# Patient Record
Sex: FEMALE | Race: BLACK OR AFRICAN AMERICAN | Employment: UNEMPLOYED | URBAN - METROPOLITAN AREA
[De-identification: names, ages, dates, MRNs, and addresses within clinical notes are randomized per-mention and may not be internally consistent; named-entity substitution may affect disease eponyms.]

---

## 2017-01-01 ENCOUNTER — HOSPITAL ENCOUNTER (INPATIENT)
Age: 57
LOS: 12 days | Discharge: NURSING, REHAB OR LONG-TERM CARE | DRG: 698 | End: 2017-01-20
Attending: EMERGENCY MEDICINE | Admitting: INTERNAL MEDICINE
Payer: MEDICARE

## 2017-01-01 ENCOUNTER — APPOINTMENT (OUTPATIENT)
Dept: CT IMAGING | Age: 57
DRG: 698 | End: 2017-01-01
Attending: EMERGENCY MEDICINE
Payer: MEDICARE

## 2017-01-01 ENCOUNTER — APPOINTMENT (OUTPATIENT)
Dept: GENERAL RADIOLOGY | Age: 57
DRG: 698 | End: 2017-01-01
Attending: EMERGENCY MEDICINE
Payer: MEDICARE

## 2017-01-01 ENCOUNTER — PATIENT OUTREACH (OUTPATIENT)
Dept: INTERNAL MEDICINE CLINIC | Age: 57
End: 2017-01-01

## 2017-01-01 VITALS
WEIGHT: 120 LBS | TEMPERATURE: 98.3 F | HEART RATE: 103 BPM | HEIGHT: 65 IN | DIASTOLIC BLOOD PRESSURE: 80 MMHG | BODY MASS INDEX: 19.99 KG/M2 | SYSTOLIC BLOOD PRESSURE: 138 MMHG | RESPIRATION RATE: 20 BRPM | OXYGEN SATURATION: 100 %

## 2017-01-01 DIAGNOSIS — R40.4 TRANSIENT ALTERATION OF AWARENESS: ICD-10-CM

## 2017-01-01 DIAGNOSIS — R56.9 SEIZURE (HCC): ICD-10-CM

## 2017-01-01 DIAGNOSIS — N39.0 URINARY TRACT INFECTION ASSOCIATED WITH CATHETERIZATION OF URINARY TRACT, UNSPECIFIED INDWELLING URINARY CATHETER TYPE, INITIAL ENCOUNTER (HCC): Primary | ICD-10-CM

## 2017-01-01 DIAGNOSIS — T83.511A URINARY TRACT INFECTION ASSOCIATED WITH CATHETERIZATION OF URINARY TRACT, UNSPECIFIED INDWELLING URINARY CATHETER TYPE, INITIAL ENCOUNTER (HCC): Primary | ICD-10-CM

## 2017-01-01 LAB
ABO + RH BLD: NORMAL
ALBUMIN SERPL BCP-MCNC: 1 G/DL (ref 3.5–5)
ALBUMIN SERPL BCP-MCNC: 1.1 G/DL (ref 3.5–5)
ALBUMIN/GLOB SERPL: 0.2 {RATIO} (ref 1.1–2.2)
ALBUMIN/GLOB SERPL: 0.2 {RATIO} (ref 1.1–2.2)
ALP SERPL-CCNC: 387 U/L (ref 45–117)
ALP SERPL-CCNC: 399 U/L (ref 45–117)
ALT SERPL-CCNC: 12 U/L (ref 12–78)
ALT SERPL-CCNC: 13 U/L (ref 12–78)
AMMONIA PLAS-SCNC: 35 UMOL/L
AMPHET UR QL SCN: NEGATIVE
ANION GAP BLD CALC-SCNC: 10 MMOL/L (ref 5–15)
ANION GAP BLD CALC-SCNC: 11 MMOL/L (ref 5–15)
ANION GAP BLD CALC-SCNC: 11 MMOL/L (ref 5–15)
ANION GAP BLD CALC-SCNC: 9 MMOL/L (ref 5–15)
APPEARANCE UR: ABNORMAL
AST SERPL W P-5'-P-CCNC: 18 U/L (ref 15–37)
AST SERPL W P-5'-P-CCNC: 27 U/L (ref 15–37)
ATRIAL RATE: 102 BPM
BACTERIA SPEC CULT: NORMAL
BACTERIA URNS QL MICRO: ABNORMAL /HPF
BARBITURATES UR QL SCN: NEGATIVE
BASOPHILS # BLD AUTO: 0 K/UL (ref 0–0.1)
BASOPHILS # BLD: 0 % (ref 0–1)
BENZODIAZ UR QL: NEGATIVE
BILIRUB SERPL-MCNC: 0.1 MG/DL (ref 0.2–1)
BILIRUB SERPL-MCNC: 0.2 MG/DL (ref 0.2–1)
BILIRUB UR QL: NEGATIVE
BLD PROD TYP BPU: NORMAL
BLOOD GROUP ANTIBODIES SERPL: NORMAL
BPU ID: NORMAL
BUN SERPL-MCNC: 10 MG/DL (ref 6–20)
BUN SERPL-MCNC: 11 MG/DL (ref 6–20)
BUN SERPL-MCNC: 15 MG/DL (ref 6–20)
BUN SERPL-MCNC: 17 MG/DL (ref 6–20)
BUN SERPL-MCNC: 18 MG/DL (ref 6–20)
BUN/CREAT SERPL: 16 (ref 12–20)
BUN/CREAT SERPL: 19 (ref 12–20)
BUN/CREAT SERPL: 20 (ref 12–20)
BUN/CREAT SERPL: 21 (ref 12–20)
BUN/CREAT SERPL: 22 (ref 12–20)
BUN/CREAT SERPL: 23 (ref 12–20)
BUN/CREAT SERPL: 23 (ref 12–20)
C DIFF TOX GENS STL QL NAA+PROBE: NEGATIVE
C JEJUNI+C COLI AG STL QL: NEGATIVE
CALCIUM SERPL-MCNC: 7.1 MG/DL (ref 8.5–10.1)
CALCIUM SERPL-MCNC: 7.3 MG/DL (ref 8.5–10.1)
CALCIUM SERPL-MCNC: 7.6 MG/DL (ref 8.5–10.1)
CALCIUM SERPL-MCNC: 7.7 MG/DL (ref 8.5–10.1)
CALCIUM SERPL-MCNC: 7.7 MG/DL (ref 8.5–10.1)
CALCIUM SERPL-MCNC: 7.8 MG/DL (ref 8.5–10.1)
CALCIUM SERPL-MCNC: 7.9 MG/DL (ref 8.5–10.1)
CALCULATED P AXIS, ECG09: 52 DEGREES
CALCULATED R AXIS, ECG10: 15 DEGREES
CALCULATED T AXIS, ECG11: 69 DEGREES
CANNABINOIDS UR QL SCN: NEGATIVE
CC UR VC: NORMAL
CHLORIDE SERPL-SCNC: 105 MMOL/L (ref 97–108)
CHLORIDE SERPL-SCNC: 106 MMOL/L (ref 97–108)
CHLORIDE SERPL-SCNC: 109 MMOL/L (ref 97–108)
CHLORIDE SERPL-SCNC: 112 MMOL/L (ref 97–108)
CHLORIDE SERPL-SCNC: 112 MMOL/L (ref 97–108)
CHLORIDE SERPL-SCNC: 113 MMOL/L (ref 97–108)
CHLORIDE SERPL-SCNC: 117 MMOL/L (ref 97–108)
CHOLEST SERPL-MCNC: 136 MG/DL
CK SERPL-CCNC: 178 U/L (ref 26–192)
CO2 SERPL-SCNC: 18 MMOL/L (ref 21–32)
CO2 SERPL-SCNC: 20 MMOL/L (ref 21–32)
CO2 SERPL-SCNC: 21 MMOL/L (ref 21–32)
CO2 SERPL-SCNC: 21 MMOL/L (ref 21–32)
CO2 SERPL-SCNC: 23 MMOL/L (ref 21–32)
CO2 SERPL-SCNC: 25 MMOL/L (ref 21–32)
CO2 SERPL-SCNC: 26 MMOL/L (ref 21–32)
COCAINE UR QL SCN: NEGATIVE
COLOR UR: ABNORMAL
CREAT SERPL-MCNC: 0.46 MG/DL (ref 0.55–1.02)
CREAT SERPL-MCNC: 0.47 MG/DL (ref 0.55–1.02)
CREAT SERPL-MCNC: 0.53 MG/DL (ref 0.55–1.02)
CREAT SERPL-MCNC: 0.63 MG/DL (ref 0.55–1.02)
CREAT SERPL-MCNC: 0.72 MG/DL (ref 0.55–1.02)
CREAT SERPL-MCNC: 0.77 MG/DL (ref 0.55–1.02)
CREAT SERPL-MCNC: 0.83 MG/DL (ref 0.55–1.02)
CROSSMATCH RESULT,%XM: NORMAL
DIAGNOSIS, 93000: NORMAL
DIFFERENTIAL METHOD BLD: ABNORMAL
DIFFERENTIAL METHOD BLD: ABNORMAL
DRUG SCRN COMMENT,DRGCM: NORMAL
E COLI SXT1+2 STL IA: NO GROWTH
EOSINOPHIL # BLD: 0 K/UL (ref 0–0.4)
EOSINOPHIL # BLD: 0 K/UL (ref 0–0.4)
EOSINOPHIL # BLD: 0.1 K/UL (ref 0–0.4)
EOSINOPHIL # BLD: 0.1 K/UL (ref 0–0.4)
EOSINOPHIL NFR BLD: 0 % (ref 0–7)
EOSINOPHIL NFR BLD: 1 % (ref 0–7)
EPITH CASTS URNS QL MICRO: ABNORMAL /LPF
ERYTHROCYTE [DISTWIDTH] IN BLOOD BY AUTOMATED COUNT: 14.9 % (ref 11.5–14.5)
ERYTHROCYTE [DISTWIDTH] IN BLOOD BY AUTOMATED COUNT: 15.1 % (ref 11.5–14.5)
ERYTHROCYTE [DISTWIDTH] IN BLOOD BY AUTOMATED COUNT: 15.2 % (ref 11.5–14.5)
EST. AVERAGE GLUCOSE BLD GHB EST-MCNC: 143 MG/DL
FAT STL QL: NORMAL
FERRITIN SERPL-MCNC: 1064 NG/ML (ref 8–252)
GLOBULIN SER CALC-MCNC: 5.2 G/DL (ref 2–4)
GLOBULIN SER CALC-MCNC: 5.9 G/DL (ref 2–4)
GLUCOSE BLD STRIP.AUTO-MCNC: 100 MG/DL (ref 65–100)
GLUCOSE BLD STRIP.AUTO-MCNC: 102 MG/DL (ref 65–100)
GLUCOSE BLD STRIP.AUTO-MCNC: 103 MG/DL (ref 65–100)
GLUCOSE BLD STRIP.AUTO-MCNC: 106 MG/DL (ref 65–100)
GLUCOSE BLD STRIP.AUTO-MCNC: 107 MG/DL (ref 65–100)
GLUCOSE BLD STRIP.AUTO-MCNC: 109 MG/DL (ref 65–100)
GLUCOSE BLD STRIP.AUTO-MCNC: 111 MG/DL (ref 65–100)
GLUCOSE BLD STRIP.AUTO-MCNC: 112 MG/DL (ref 65–100)
GLUCOSE BLD STRIP.AUTO-MCNC: 113 MG/DL (ref 65–100)
GLUCOSE BLD STRIP.AUTO-MCNC: 115 MG/DL (ref 65–100)
GLUCOSE BLD STRIP.AUTO-MCNC: 118 MG/DL (ref 65–100)
GLUCOSE BLD STRIP.AUTO-MCNC: 119 MG/DL (ref 65–100)
GLUCOSE BLD STRIP.AUTO-MCNC: 123 MG/DL (ref 65–100)
GLUCOSE BLD STRIP.AUTO-MCNC: 124 MG/DL (ref 65–100)
GLUCOSE BLD STRIP.AUTO-MCNC: 126 MG/DL (ref 65–100)
GLUCOSE BLD STRIP.AUTO-MCNC: 127 MG/DL (ref 65–100)
GLUCOSE BLD STRIP.AUTO-MCNC: 131 MG/DL (ref 65–100)
GLUCOSE BLD STRIP.AUTO-MCNC: 131 MG/DL (ref 65–100)
GLUCOSE BLD STRIP.AUTO-MCNC: 133 MG/DL (ref 65–100)
GLUCOSE BLD STRIP.AUTO-MCNC: 135 MG/DL (ref 65–100)
GLUCOSE BLD STRIP.AUTO-MCNC: 138 MG/DL (ref 65–100)
GLUCOSE BLD STRIP.AUTO-MCNC: 140 MG/DL (ref 65–100)
GLUCOSE BLD STRIP.AUTO-MCNC: 144 MG/DL (ref 65–100)
GLUCOSE BLD STRIP.AUTO-MCNC: 158 MG/DL (ref 65–100)
GLUCOSE BLD STRIP.AUTO-MCNC: 166 MG/DL (ref 65–100)
GLUCOSE BLD STRIP.AUTO-MCNC: 168 MG/DL (ref 65–100)
GLUCOSE BLD STRIP.AUTO-MCNC: 169 MG/DL (ref 65–100)
GLUCOSE BLD STRIP.AUTO-MCNC: 170 MG/DL (ref 65–100)
GLUCOSE BLD STRIP.AUTO-MCNC: 178 MG/DL (ref 65–100)
GLUCOSE BLD STRIP.AUTO-MCNC: 192 MG/DL (ref 65–100)
GLUCOSE BLD STRIP.AUTO-MCNC: 193 MG/DL (ref 65–100)
GLUCOSE BLD STRIP.AUTO-MCNC: 197 MG/DL (ref 65–100)
GLUCOSE BLD STRIP.AUTO-MCNC: 203 MG/DL (ref 65–100)
GLUCOSE BLD STRIP.AUTO-MCNC: 205 MG/DL (ref 65–100)
GLUCOSE BLD STRIP.AUTO-MCNC: 209 MG/DL (ref 65–100)
GLUCOSE BLD STRIP.AUTO-MCNC: 306 MG/DL (ref 65–100)
GLUCOSE BLD STRIP.AUTO-MCNC: 60 MG/DL (ref 65–100)
GLUCOSE BLD STRIP.AUTO-MCNC: 66 MG/DL (ref 65–100)
GLUCOSE BLD STRIP.AUTO-MCNC: 67 MG/DL (ref 65–100)
GLUCOSE BLD STRIP.AUTO-MCNC: 72 MG/DL (ref 65–100)
GLUCOSE BLD STRIP.AUTO-MCNC: 74 MG/DL (ref 65–100)
GLUCOSE BLD STRIP.AUTO-MCNC: 76 MG/DL (ref 65–100)
GLUCOSE BLD STRIP.AUTO-MCNC: 77 MG/DL (ref 65–100)
GLUCOSE BLD STRIP.AUTO-MCNC: 79 MG/DL (ref 65–100)
GLUCOSE BLD STRIP.AUTO-MCNC: 79 MG/DL (ref 65–100)
GLUCOSE BLD STRIP.AUTO-MCNC: 81 MG/DL (ref 65–100)
GLUCOSE BLD STRIP.AUTO-MCNC: 82 MG/DL (ref 65–100)
GLUCOSE BLD STRIP.AUTO-MCNC: 83 MG/DL (ref 65–100)
GLUCOSE BLD STRIP.AUTO-MCNC: 85 MG/DL (ref 65–100)
GLUCOSE BLD STRIP.AUTO-MCNC: 87 MG/DL (ref 65–100)
GLUCOSE BLD STRIP.AUTO-MCNC: 87 MG/DL (ref 65–100)
GLUCOSE BLD STRIP.AUTO-MCNC: 89 MG/DL (ref 65–100)
GLUCOSE BLD STRIP.AUTO-MCNC: 92 MG/DL (ref 65–100)
GLUCOSE BLD STRIP.AUTO-MCNC: 94 MG/DL (ref 65–100)
GLUCOSE BLD STRIP.AUTO-MCNC: 94 MG/DL (ref 65–100)
GLUCOSE SERPL-MCNC: 101 MG/DL (ref 65–100)
GLUCOSE SERPL-MCNC: 114 MG/DL (ref 65–100)
GLUCOSE SERPL-MCNC: 130 MG/DL (ref 65–100)
GLUCOSE SERPL-MCNC: 330 MG/DL (ref 65–100)
GLUCOSE SERPL-MCNC: 362 MG/DL (ref 65–100)
GLUCOSE SERPL-MCNC: 69 MG/DL (ref 65–100)
GLUCOSE SERPL-MCNC: 71 MG/DL (ref 65–100)
GLUCOSE UR STRIP.AUTO-MCNC: 250 MG/DL
HBA1C MFR BLD: 6.6 % (ref 4.2–6.3)
HCT VFR BLD AUTO: 22.6 % (ref 35–47)
HCT VFR BLD AUTO: 24.3 % (ref 35–47)
HCT VFR BLD AUTO: 26.4 % (ref 35–47)
HCT VFR BLD AUTO: 26.8 % (ref 35–47)
HCT VFR BLD AUTO: 28.2 % (ref 35–47)
HDLC SERPL-MCNC: 35 MG/DL
HDLC SERPL: 3.9 {RATIO} (ref 0–5)
HGB BLD-MCNC: 6.8 G/DL (ref 11.5–16)
HGB BLD-MCNC: 7.2 G/DL (ref 11.5–16)
HGB BLD-MCNC: 8 G/DL (ref 11.5–16)
HGB BLD-MCNC: 8 G/DL (ref 11.5–16)
HGB BLD-MCNC: 8.9 G/DL (ref 11.5–16)
HGB UR QL STRIP: ABNORMAL
IRON SATN MFR SERPL: 32 % (ref 20–50)
IRON SERPL-MCNC: 22 UG/DL (ref 35–150)
KETONES UR QL STRIP.AUTO: ABNORMAL MG/DL
LACTATE SERPL-SCNC: 1.6 MMOL/L (ref 0.4–2)
LACTATE SERPL-SCNC: 1.7 MMOL/L (ref 0.4–2)
LDLC SERPL CALC-MCNC: 69.8 MG/DL (ref 0–100)
LEUKOCYTE ESTERASE UR QL STRIP.AUTO: ABNORMAL
LEVETIRACETAM SERPL-MCNC: NORMAL UG/ML (ref 10–40)
LIPID PROFILE,FLP: ABNORMAL
LYMPHOCYTES # BLD AUTO: 11 % (ref 12–49)
LYMPHOCYTES # BLD AUTO: 13 % (ref 12–49)
LYMPHOCYTES # BLD AUTO: 14 % (ref 12–49)
LYMPHOCYTES # BLD AUTO: 15 % (ref 12–49)
LYMPHOCYTES # BLD: 1.3 K/UL (ref 0.8–3.5)
LYMPHOCYTES # BLD: 1.5 K/UL (ref 0.8–3.5)
LYMPHOCYTES # BLD: 1.8 K/UL (ref 0.8–3.5)
LYMPHOCYTES # BLD: 1.8 K/UL (ref 0.8–3.5)
MAGNESIUM SERPL-MCNC: 1.6 MG/DL (ref 1.6–2.4)
MAGNESIUM SERPL-MCNC: 1.7 MG/DL (ref 1.6–2.4)
MAGNESIUM SERPL-MCNC: 2.1 MG/DL (ref 1.6–2.4)
MAGNESIUM SERPL-MCNC: 2.1 MG/DL (ref 1.6–2.4)
MAGNESIUM SERPL-MCNC: 2.2 MG/DL (ref 1.6–2.4)
MCH RBC QN AUTO: 23.2 PG (ref 26–34)
MCH RBC QN AUTO: 23.4 PG (ref 26–34)
MCH RBC QN AUTO: 23.6 PG (ref 26–34)
MCH RBC QN AUTO: 24.6 PG (ref 26–34)
MCH RBC QN AUTO: 24.7 PG (ref 26–34)
MCHC RBC AUTO-ENTMCNC: 29.6 G/DL (ref 30–36.5)
MCHC RBC AUTO-ENTMCNC: 29.9 G/DL (ref 30–36.5)
MCHC RBC AUTO-ENTMCNC: 30.1 G/DL (ref 30–36.5)
MCHC RBC AUTO-ENTMCNC: 30.3 G/DL (ref 30–36.5)
MCHC RBC AUTO-ENTMCNC: 31.6 G/DL (ref 30–36.5)
MCV RBC AUTO: 77.9 FL (ref 80–99)
MCV RBC AUTO: 78.1 FL (ref 80–99)
MCV RBC AUTO: 78.4 FL (ref 80–99)
MCV RBC AUTO: 78.5 FL (ref 80–99)
MCV RBC AUTO: 81.5 FL (ref 80–99)
METHADONE UR QL: NEGATIVE
MONOCYTES # BLD: 0.4 K/UL (ref 0–1)
MONOCYTES # BLD: 0.5 K/UL (ref 0–1)
MONOCYTES # BLD: 0.5 K/UL (ref 0–1)
MONOCYTES # BLD: 0.6 K/UL (ref 0–1)
MONOCYTES NFR BLD AUTO: 3 % (ref 5–13)
MONOCYTES NFR BLD AUTO: 4 % (ref 5–13)
MONOCYTES NFR BLD AUTO: 4 % (ref 5–13)
MONOCYTES NFR BLD AUTO: 5 % (ref 5–13)
NEUTRAL FAT STL QL: NORMAL
NEUTS SEG # BLD: 10.2 K/UL (ref 1.8–8)
NEUTS SEG # BLD: 10.3 K/UL (ref 1.8–8)
NEUTS SEG # BLD: 9.7 K/UL (ref 1.8–8)
NEUTS SEG # BLD: 9.8 K/UL (ref 1.8–8)
NEUTS SEG NFR BLD AUTO: 81 % (ref 32–75)
NEUTS SEG NFR BLD AUTO: 81 % (ref 32–75)
NEUTS SEG NFR BLD AUTO: 83 % (ref 32–75)
NEUTS SEG NFR BLD AUTO: 85 % (ref 32–75)
NITRITE UR QL STRIP.AUTO: NEGATIVE
NRBC # BLD: 0.02 K/UL (ref 0–0.01)
NRBC BLD-RTO: 0.1 PER 100 WBC
OPIATES UR QL: NEGATIVE
P-R INTERVAL, ECG05: 136 MS
PCP UR QL: NEGATIVE
PH UR STRIP: 6 [PH] (ref 5–8)
PLATELET # BLD AUTO: 450 K/UL (ref 150–400)
PLATELET # BLD AUTO: 494 K/UL (ref 150–400)
PLATELET # BLD AUTO: 516 K/UL (ref 150–400)
PLATELET # BLD AUTO: 523 K/UL (ref 150–400)
PLATELET # BLD AUTO: 558 K/UL (ref 150–400)
POTASSIUM SERPL-SCNC: 2.9 MMOL/L (ref 3.5–5.1)
POTASSIUM SERPL-SCNC: 3.2 MMOL/L (ref 3.5–5.1)
POTASSIUM SERPL-SCNC: 3.4 MMOL/L (ref 3.5–5.1)
POTASSIUM SERPL-SCNC: 3.6 MMOL/L (ref 3.5–5.1)
POTASSIUM SERPL-SCNC: 4.6 MMOL/L (ref 3.5–5.1)
PROT SERPL-MCNC: 6.2 G/DL (ref 6.4–8.2)
PROT SERPL-MCNC: 7 G/DL (ref 6.4–8.2)
PROT UR STRIP-MCNC: >300 MG/DL
Q-T INTERVAL, ECG07: 354 MS
QRS DURATION, ECG06: 64 MS
QTC CALCULATION (BEZET), ECG08: 461 MS
RBC # BLD AUTO: 2.88 M/UL (ref 3.8–5.2)
RBC # BLD AUTO: 3.11 M/UL (ref 3.8–5.2)
RBC # BLD AUTO: 3.24 M/UL (ref 3.8–5.2)
RBC # BLD AUTO: 3.42 M/UL (ref 3.8–5.2)
RBC # BLD AUTO: 3.62 M/UL (ref 3.8–5.2)
RBC #/AREA URNS HPF: ABNORMAL /HPF (ref 0–5)
RBC MORPH BLD: ABNORMAL
RETICS/RBC NFR AUTO: 1.5 % (ref 0.7–2.1)
SERVICE CMNT-IMP: ABNORMAL
SERVICE CMNT-IMP: NORMAL
SODIUM SERPL-SCNC: 140 MMOL/L (ref 136–145)
SODIUM SERPL-SCNC: 141 MMOL/L (ref 136–145)
SODIUM SERPL-SCNC: 142 MMOL/L (ref 136–145)
SODIUM SERPL-SCNC: 142 MMOL/L (ref 136–145)
SODIUM SERPL-SCNC: 143 MMOL/L (ref 136–145)
SODIUM SERPL-SCNC: 145 MMOL/L (ref 136–145)
SODIUM SERPL-SCNC: 146 MMOL/L (ref 136–145)
SP GR UR REFRACTOMETRY: 1.02 (ref 1–1.03)
SPECIMEN EXP DATE BLD: NORMAL
STATUS OF UNIT,%ST: NORMAL
TIBC SERPL-MCNC: 68 UG/DL (ref 250–450)
TRIGL SERPL-MCNC: 156 MG/DL (ref ?–150)
TROPONIN I SERPL-MCNC: <0.04 NG/ML
TSH SERPL DL<=0.05 MIU/L-ACNC: 1.25 UIU/ML (ref 0.36–3.74)
UA: UC IF INDICATED,UAUC: ABNORMAL
UNIT DIVISION, %UDIV: 0
UROBILINOGEN UR QL STRIP.AUTO: 0.2 EU/DL (ref 0.2–1)
VENTRICULAR RATE, ECG03: 102 BPM
VLDLC SERPL CALC-MCNC: 31.2 MG/DL
WBC # BLD AUTO: 11.9 K/UL (ref 3.6–11)
WBC # BLD AUTO: 11.9 K/UL (ref 3.6–11)
WBC # BLD AUTO: 12 K/UL (ref 3.6–11)
WBC # BLD AUTO: 12 K/UL (ref 3.6–11)
WBC # BLD AUTO: 12.5 K/UL (ref 3.6–11)
WBC #/AREA STL HPF: NORMAL /HPF (ref 0–4)
WBC NRBC COR # BLD: ABNORMAL 10*3/UL
WBC URNS QL MICRO: >100 /HPF (ref 0–4)

## 2017-01-01 PROCEDURE — 36415 COLL VENOUS BLD VENIPUNCTURE: CPT | Performed by: INTERNAL MEDICINE

## 2017-01-01 PROCEDURE — 80053 COMPREHEN METABOLIC PANEL: CPT | Performed by: EMERGENCY MEDICINE

## 2017-01-01 PROCEDURE — 74011250636 HC RX REV CODE- 250/636: Performed by: HOSPITALIST

## 2017-01-01 PROCEDURE — 83735 ASSAY OF MAGNESIUM: CPT | Performed by: HOSPITALIST

## 2017-01-01 PROCEDURE — 82962 GLUCOSE BLOOD TEST: CPT

## 2017-01-01 PROCEDURE — 74011250637 HC RX REV CODE- 250/637: Performed by: INTERNAL MEDICINE

## 2017-01-01 PROCEDURE — 65270000015 HC RM PRIVATE ONCOLOGY

## 2017-01-01 PROCEDURE — G8978 MOBILITY CURRENT STATUS: HCPCS

## 2017-01-01 PROCEDURE — 36415 COLL VENOUS BLD VENIPUNCTURE: CPT | Performed by: HOSPITALIST

## 2017-01-01 PROCEDURE — 87045 FECES CULTURE AEROBIC BACT: CPT | Performed by: INTERNAL MEDICINE

## 2017-01-01 PROCEDURE — 83735 ASSAY OF MAGNESIUM: CPT | Performed by: INTERNAL MEDICINE

## 2017-01-01 PROCEDURE — 77030011256 HC DRSG MEPILEX <16IN NO BORD MOLN -A

## 2017-01-01 PROCEDURE — 77030018846 HC SOL IRR STRL H20 ICUM -A

## 2017-01-01 PROCEDURE — 86900 BLOOD TYPING SEROLOGIC ABO: CPT | Performed by: INTERNAL MEDICINE

## 2017-01-01 PROCEDURE — 74011250637 HC RX REV CODE- 250/637: Performed by: HOSPITALIST

## 2017-01-01 PROCEDURE — 80048 BASIC METABOLIC PNL TOTAL CA: CPT | Performed by: INTERNAL MEDICINE

## 2017-01-01 PROCEDURE — 74011000258 HC RX REV CODE- 258: Performed by: INTERNAL MEDICINE

## 2017-01-01 PROCEDURE — 74011250636 HC RX REV CODE- 250/636: Performed by: INTERNAL MEDICINE

## 2017-01-01 PROCEDURE — 74011636637 HC RX REV CODE- 636/637: Performed by: INTERNAL MEDICINE

## 2017-01-01 PROCEDURE — 93005 ELECTROCARDIOGRAM TRACING: CPT

## 2017-01-01 PROCEDURE — 77030009526 HC GEL CARSYN MDII -A

## 2017-01-01 PROCEDURE — 82705 FATS/LIPIDS FECES QUAL: CPT | Performed by: HOSPITALIST

## 2017-01-01 PROCEDURE — 74011000258 HC RX REV CODE- 258: Performed by: EMERGENCY MEDICINE

## 2017-01-01 PROCEDURE — 87493 C DIFF AMPLIFIED PROBE: CPT | Performed by: EMERGENCY MEDICINE

## 2017-01-01 PROCEDURE — 83036 HEMOGLOBIN GLYCOSYLATED A1C: CPT | Performed by: INTERNAL MEDICINE

## 2017-01-01 PROCEDURE — 65270000029 HC RM PRIVATE

## 2017-01-01 PROCEDURE — 87186 SC STD MICRODIL/AGAR DIL: CPT | Performed by: EMERGENCY MEDICINE

## 2017-01-01 PROCEDURE — 74011250637 HC RX REV CODE- 250/637: Performed by: PSYCHIATRY & NEUROLOGY

## 2017-01-01 PROCEDURE — 85025 COMPLETE CBC W/AUTO DIFF WBC: CPT | Performed by: INTERNAL MEDICINE

## 2017-01-01 PROCEDURE — 30233N1 TRANSFUSION OF NONAUTOLOGOUS RED BLOOD CELLS INTO PERIPHERAL VEIN, PERCUTANEOUS APPROACH: ICD-10-PCS | Performed by: INTERNAL MEDICINE

## 2017-01-01 PROCEDURE — 80177 DRUG SCRN QUAN LEVETIRACETAM: CPT | Performed by: INTERNAL MEDICINE

## 2017-01-01 PROCEDURE — 77030018836 HC SOL IRR NACL ICUM -A

## 2017-01-01 PROCEDURE — 80048 BASIC METABOLIC PNL TOTAL CA: CPT | Performed by: HOSPITALIST

## 2017-01-01 PROCEDURE — 36430 TRANSFUSION BLD/BLD COMPNT: CPT

## 2017-01-01 PROCEDURE — 83735 ASSAY OF MAGNESIUM: CPT | Performed by: EMERGENCY MEDICINE

## 2017-01-01 PROCEDURE — 87077 CULTURE AEROBIC IDENTIFY: CPT | Performed by: INTERNAL MEDICINE

## 2017-01-01 PROCEDURE — 97530 THERAPEUTIC ACTIVITIES: CPT | Performed by: PHYSICAL THERAPIST

## 2017-01-01 PROCEDURE — 77030019607 HC DSG BURN S&N -A

## 2017-01-01 PROCEDURE — 65660000000 HC RM CCU STEPDOWN

## 2017-01-01 PROCEDURE — 97165 OT EVAL LOW COMPLEX 30 MIN: CPT

## 2017-01-01 PROCEDURE — 82550 ASSAY OF CK (CPK): CPT | Performed by: EMERGENCY MEDICINE

## 2017-01-01 PROCEDURE — 74011250636 HC RX REV CODE- 250/636: Performed by: EMERGENCY MEDICINE

## 2017-01-01 PROCEDURE — 84484 ASSAY OF TROPONIN QUANT: CPT | Performed by: EMERGENCY MEDICINE

## 2017-01-01 PROCEDURE — 36415 COLL VENOUS BLD VENIPUNCTURE: CPT | Performed by: EMERGENCY MEDICINE

## 2017-01-01 PROCEDURE — 83605 ASSAY OF LACTIC ACID: CPT | Performed by: INTERNAL MEDICINE

## 2017-01-01 PROCEDURE — 85045 AUTOMATED RETICULOCYTE COUNT: CPT | Performed by: INTERNAL MEDICINE

## 2017-01-01 PROCEDURE — 97163 PT EVAL HIGH COMPLEX 45 MIN: CPT

## 2017-01-01 PROCEDURE — 80307 DRUG TEST PRSMV CHEM ANLYZR: CPT | Performed by: INTERNAL MEDICINE

## 2017-01-01 PROCEDURE — G8987 SELF CARE CURRENT STATUS: HCPCS

## 2017-01-01 PROCEDURE — 95816 EEG AWAKE AND DROWSY: CPT | Performed by: PSYCHIATRY & NEUROLOGY

## 2017-01-01 PROCEDURE — 85027 COMPLETE CBC AUTOMATED: CPT | Performed by: HOSPITALIST

## 2017-01-01 PROCEDURE — 85025 COMPLETE CBC W/AUTO DIFF WBC: CPT | Performed by: EMERGENCY MEDICINE

## 2017-01-01 PROCEDURE — 96365 THER/PROPH/DIAG IV INF INIT: CPT

## 2017-01-01 PROCEDURE — 80061 LIPID PANEL: CPT | Performed by: INTERNAL MEDICINE

## 2017-01-01 PROCEDURE — 81001 URINALYSIS AUTO W/SCOPE: CPT | Performed by: EMERGENCY MEDICINE

## 2017-01-01 PROCEDURE — 99284 EMERGENCY DEPT VISIT MOD MDM: CPT

## 2017-01-01 PROCEDURE — 87040 BLOOD CULTURE FOR BACTERIA: CPT | Performed by: INTERNAL MEDICINE

## 2017-01-01 PROCEDURE — 83605 ASSAY OF LACTIC ACID: CPT | Performed by: EMERGENCY MEDICINE

## 2017-01-01 PROCEDURE — 84443 ASSAY THYROID STIM HORMONE: CPT | Performed by: INTERNAL MEDICINE

## 2017-01-01 PROCEDURE — 77030029131 HC ADMN ST IV BLD N DEHP ICUM -B

## 2017-01-01 PROCEDURE — 70450 CT HEAD/BRAIN W/O DYE: CPT

## 2017-01-01 PROCEDURE — 83540 ASSAY OF IRON: CPT | Performed by: INTERNAL MEDICINE

## 2017-01-01 PROCEDURE — 80053 COMPREHEN METABOLIC PANEL: CPT | Performed by: INTERNAL MEDICINE

## 2017-01-01 PROCEDURE — 87077 CULTURE AEROBIC IDENTIFY: CPT | Performed by: EMERGENCY MEDICINE

## 2017-01-01 PROCEDURE — 82140 ASSAY OF AMMONIA: CPT | Performed by: INTERNAL MEDICINE

## 2017-01-01 PROCEDURE — 77030033263 HC DRSG MEPILEX 16-48IN BORD MOLN -B

## 2017-01-01 PROCEDURE — 87181 SC STD AGAR DILUTION PER AGT: CPT | Performed by: EMERGENCY MEDICINE

## 2017-01-01 PROCEDURE — 71010 XR CHEST SNGL V: CPT

## 2017-01-01 PROCEDURE — 82728 ASSAY OF FERRITIN: CPT | Performed by: INTERNAL MEDICINE

## 2017-01-01 PROCEDURE — P9016 RBC LEUKOCYTES REDUCED: HCPCS | Performed by: INTERNAL MEDICINE

## 2017-01-01 PROCEDURE — 86920 COMPATIBILITY TEST SPIN: CPT | Performed by: INTERNAL MEDICINE

## 2017-01-01 PROCEDURE — 87086 URINE CULTURE/COLONY COUNT: CPT | Performed by: EMERGENCY MEDICINE

## 2017-01-01 PROCEDURE — 89055 LEUKOCYTE ASSESSMENT FECAL: CPT | Performed by: INTERNAL MEDICINE

## 2017-01-01 PROCEDURE — G8979 MOBILITY GOAL STATUS: HCPCS

## 2017-01-01 PROCEDURE — 77030010547 HC BG URIN W/UMETER -A

## 2017-01-01 RX ORDER — SERTRALINE HYDROCHLORIDE 50 MG/1
50 TABLET, FILM COATED ORAL DAILY
Status: DISCONTINUED | OUTPATIENT
Start: 2017-01-01 | End: 2017-01-01

## 2017-01-01 RX ORDER — INSULIN LISPRO 100 [IU]/ML
INJECTION, SOLUTION INTRAVENOUS; SUBCUTANEOUS
Status: DISCONTINUED | OUTPATIENT
Start: 2017-01-01 | End: 2017-01-01 | Stop reason: HOSPADM

## 2017-01-01 RX ORDER — SERTRALINE HYDROCHLORIDE 50 MG/1
50 TABLET, FILM COATED ORAL DAILY
Qty: 30 TAB | Refills: 0 | Status: SHIPPED | OUTPATIENT
Start: 2017-01-01

## 2017-01-01 RX ORDER — LORAZEPAM 0.5 MG/1
0.5 TABLET ORAL
Status: DISCONTINUED | OUTPATIENT
Start: 2017-01-01 | End: 2017-01-01 | Stop reason: HOSPADM

## 2017-01-01 RX ORDER — LORAZEPAM 0.5 MG/1
0.5 TABLET ORAL
Qty: 10 TAB | Refills: 0 | Status: SHIPPED | OUTPATIENT
Start: 2017-01-01

## 2017-01-01 RX ORDER — LEVETIRACETAM 1000 MG/1
1000 TABLET ORAL 2 TIMES DAILY
Qty: 60 TAB | Refills: 0 | Status: SHIPPED | OUTPATIENT
Start: 2017-01-01

## 2017-01-01 RX ORDER — SODIUM CHLORIDE 9 MG/ML
100 INJECTION, SOLUTION INTRAVENOUS CONTINUOUS
Status: DISCONTINUED | OUTPATIENT
Start: 2017-01-01 | End: 2017-01-01

## 2017-01-01 RX ORDER — METFORMIN HYDROCHLORIDE 500 MG/1
500 TABLET ORAL 2 TIMES DAILY WITH MEALS
Status: DISCONTINUED | OUTPATIENT
Start: 2017-01-01 | End: 2017-01-01

## 2017-01-01 RX ORDER — POTASSIUM CHLORIDE 20 MEQ/1
20 TABLET, EXTENDED RELEASE ORAL 2 TIMES DAILY
Qty: 60 TAB | Refills: 0 | Status: SHIPPED | OUTPATIENT
Start: 2017-01-01 | End: 2017-01-01

## 2017-01-01 RX ORDER — LOPERAMIDE HYDROCHLORIDE 2 MG/1
4 CAPSULE ORAL
Status: DISCONTINUED | OUTPATIENT
Start: 2017-01-01 | End: 2017-01-01 | Stop reason: HOSPADM

## 2017-01-01 RX ORDER — CIPROFLOXACIN 250 MG/1
750 TABLET, FILM COATED ORAL EVERY 12 HOURS
Status: COMPLETED | OUTPATIENT
Start: 2017-01-01 | End: 2017-01-01

## 2017-01-01 RX ORDER — SERTRALINE HYDROCHLORIDE 50 MG/1
50 TABLET, FILM COATED ORAL DAILY
Qty: 10 TAB | Refills: 0 | Status: SHIPPED
Start: 2017-01-01 | End: 2017-01-01

## 2017-01-01 RX ORDER — MAGNESIUM SULFATE HEPTAHYDRATE 40 MG/ML
2 INJECTION, SOLUTION INTRAVENOUS ONCE
Status: COMPLETED | OUTPATIENT
Start: 2017-01-01 | End: 2017-01-01

## 2017-01-01 RX ORDER — ACETAMINOPHEN 325 MG/1
650 TABLET ORAL
Status: DISCONTINUED | OUTPATIENT
Start: 2017-01-01 | End: 2017-01-01 | Stop reason: HOSPADM

## 2017-01-01 RX ORDER — FAMOTIDINE 20 MG/1
20 TABLET, FILM COATED ORAL DAILY
Status: DISCONTINUED | OUTPATIENT
Start: 2017-01-01 | End: 2017-01-01 | Stop reason: HOSPADM

## 2017-01-01 RX ORDER — HEPARIN SODIUM 5000 [USP'U]/ML
5000 INJECTION, SOLUTION INTRAVENOUS; SUBCUTANEOUS EVERY 8 HOURS
Status: DISCONTINUED | OUTPATIENT
Start: 2017-01-01 | End: 2017-01-01 | Stop reason: HOSPADM

## 2017-01-01 RX ORDER — MAGNESIUM SULFATE 100 %
4 CRYSTALS MISCELLANEOUS AS NEEDED
Status: DISCONTINUED | OUTPATIENT
Start: 2017-01-01 | End: 2017-01-01 | Stop reason: HOSPADM

## 2017-01-01 RX ORDER — SODIUM CHLORIDE 0.9 % (FLUSH) 0.9 %
5-10 SYRINGE (ML) INJECTION EVERY 8 HOURS
Status: DISCONTINUED | OUTPATIENT
Start: 2017-01-01 | End: 2017-01-01 | Stop reason: HOSPADM

## 2017-01-01 RX ORDER — OXYCODONE HYDROCHLORIDE 5 MG/1
5 TABLET ORAL
Status: DISCONTINUED | OUTPATIENT
Start: 2017-01-01 | End: 2017-01-01 | Stop reason: HOSPADM

## 2017-01-01 RX ORDER — LEVETIRACETAM 500 MG/1
500 TABLET ORAL 2 TIMES DAILY
Status: DISCONTINUED | OUTPATIENT
Start: 2017-01-01 | End: 2017-01-01

## 2017-01-01 RX ORDER — LANOLIN ALCOHOL/MO/W.PET/CERES
400 CREAM (GRAM) TOPICAL 2 TIMES DAILY
Status: DISCONTINUED | OUTPATIENT
Start: 2017-01-01 | End: 2017-01-01

## 2017-01-01 RX ORDER — SODIUM CHLORIDE 0.9 % (FLUSH) 0.9 %
5-10 SYRINGE (ML) INJECTION AS NEEDED
Status: DISCONTINUED | OUTPATIENT
Start: 2017-01-01 | End: 2017-01-01 | Stop reason: HOSPADM

## 2017-01-01 RX ORDER — POTASSIUM CHLORIDE 750 MG/1
40 TABLET, FILM COATED, EXTENDED RELEASE ORAL EVERY 6 HOURS
Status: COMPLETED | OUTPATIENT
Start: 2017-01-01 | End: 2017-01-01

## 2017-01-01 RX ORDER — DEXTROSE 50 % IN WATER (D50W) INTRAVENOUS SYRINGE
12.5-25 AS NEEDED
Status: DISCONTINUED | OUTPATIENT
Start: 2017-01-01 | End: 2017-01-01 | Stop reason: HOSPADM

## 2017-01-01 RX ORDER — LANOLIN ALCOHOL/MO/W.PET/CERES
325 CREAM (GRAM) TOPICAL
Status: DISCONTINUED | OUTPATIENT
Start: 2017-01-01 | End: 2017-01-01 | Stop reason: HOSPADM

## 2017-01-01 RX ORDER — POTASSIUM CHLORIDE 20 MEQ/1
20 TABLET, EXTENDED RELEASE ORAL 2 TIMES DAILY
Status: DISCONTINUED | OUTPATIENT
Start: 2017-01-01 | End: 2017-01-01

## 2017-01-01 RX ORDER — CIPROFLOXACIN 750 MG/1
750 TABLET, FILM COATED ORAL EVERY 12 HOURS
Qty: 9 TAB | Refills: 0 | Status: SHIPPED | OUTPATIENT
Start: 2017-01-01 | End: 2017-01-01

## 2017-01-01 RX ORDER — LOPERAMIDE HYDROCHLORIDE 2 MG/1
4 CAPSULE ORAL
Qty: 30 CAP | Refills: 0 | Status: SHIPPED
Start: 2017-01-01 | End: 2017-01-01

## 2017-01-01 RX ORDER — LORAZEPAM 2 MG/ML
0.5 INJECTION INTRAMUSCULAR ONCE
Status: COMPLETED | OUTPATIENT
Start: 2017-01-01 | End: 2017-01-01

## 2017-01-01 RX ORDER — KETOROLAC TROMETHAMINE 30 MG/ML
30 INJECTION, SOLUTION INTRAMUSCULAR; INTRAVENOUS
Status: COMPLETED | OUTPATIENT
Start: 2017-01-01 | End: 2017-01-01

## 2017-01-01 RX ORDER — OXYCODONE HYDROCHLORIDE 5 MG/1
5 TABLET ORAL
Qty: 10 TAB | Refills: 0 | Status: SHIPPED | OUTPATIENT
Start: 2017-01-01

## 2017-01-01 RX ORDER — SERTRALINE HYDROCHLORIDE 50 MG/1
50 TABLET, FILM COATED ORAL DAILY
Status: DISCONTINUED | OUTPATIENT
Start: 2017-01-01 | End: 2017-01-01 | Stop reason: HOSPADM

## 2017-01-01 RX ORDER — POTASSIUM CHLORIDE 750 MG/1
40 TABLET, FILM COATED, EXTENDED RELEASE ORAL
Status: COMPLETED | OUTPATIENT
Start: 2017-01-01 | End: 2017-01-01

## 2017-01-01 RX ORDER — LEVETIRACETAM 500 MG/1
1000 TABLET ORAL 2 TIMES DAILY
Status: DISCONTINUED | OUTPATIENT
Start: 2017-01-01 | End: 2017-01-01 | Stop reason: HOSPADM

## 2017-01-01 RX ORDER — SODIUM CHLORIDE 9 MG/ML
250 INJECTION, SOLUTION INTRAVENOUS AS NEEDED
Status: DISCONTINUED | OUTPATIENT
Start: 2017-01-01 | End: 2017-01-01 | Stop reason: HOSPADM

## 2017-01-01 RX ORDER — CIPROFLOXACIN 250 MG/1
750 TABLET, FILM COATED ORAL EVERY 12 HOURS
Status: DISCONTINUED | OUTPATIENT
Start: 2017-01-01 | End: 2017-01-01

## 2017-01-01 RX ORDER — PANTOPRAZOLE SODIUM 40 MG/1
40 TABLET, DELAYED RELEASE ORAL
Status: DISCONTINUED | OUTPATIENT
Start: 2017-01-01 | End: 2017-01-01

## 2017-01-01 RX ORDER — POTASSIUM CHLORIDE 20 MEQ/1
20 TABLET, EXTENDED RELEASE ORAL DAILY
Status: DISCONTINUED | OUTPATIENT
Start: 2017-01-01 | End: 2017-01-01 | Stop reason: HOSPADM

## 2017-01-01 RX ADMIN — FERROUS SULFATE TAB 325 MG (65 MG ELEMENTAL FE) 325 MG: 325 (65 FE) TAB at 08:13

## 2017-01-01 RX ADMIN — FERROUS SULFATE TAB 325 MG (65 MG ELEMENTAL FE) 325 MG: 325 (65 FE) TAB at 11:22

## 2017-01-01 RX ADMIN — LOPERAMIDE HYDROCHLORIDE 4 MG: 2 CAPSULE ORAL at 18:37

## 2017-01-01 RX ADMIN — METFORMIN HYDROCHLORIDE 500 MG: 500 TABLET, FILM COATED ORAL at 17:26

## 2017-01-01 RX ADMIN — Medication 10 ML: at 14:00

## 2017-01-01 RX ADMIN — LEVETIRACETAM 1000 MG: 500 TABLET, FILM COATED ORAL at 19:50

## 2017-01-01 RX ADMIN — HEPARIN SODIUM 5000 UNITS: 5000 INJECTION, SOLUTION INTRAVENOUS; SUBCUTANEOUS at 21:30

## 2017-01-01 RX ADMIN — OXYCODONE HYDROCHLORIDE 5 MG: 5 TABLET ORAL at 22:24

## 2017-01-01 RX ADMIN — FERROUS SULFATE TAB 325 MG (65 MG ELEMENTAL FE) 325 MG: 325 (65 FE) TAB at 08:29

## 2017-01-01 RX ADMIN — HEPARIN SODIUM 5000 UNITS: 5000 INJECTION, SOLUTION INTRAVENOUS; SUBCUTANEOUS at 06:20

## 2017-01-01 RX ADMIN — SERTRALINE HYDROCHLORIDE 50 MG: 50 TABLET ORAL at 10:04

## 2017-01-01 RX ADMIN — FAMOTIDINE 20 MG: 20 TABLET ORAL at 08:50

## 2017-01-01 RX ADMIN — OXYCODONE HYDROCHLORIDE 5 MG: 5 TABLET ORAL at 06:30

## 2017-01-01 RX ADMIN — FERROUS SULFATE TAB 325 MG (65 MG ELEMENTAL FE) 325 MG: 325 (65 FE) TAB at 17:29

## 2017-01-01 RX ADMIN — ACETAMINOPHEN 650 MG: 325 TABLET, FILM COATED ORAL at 13:25

## 2017-01-01 RX ADMIN — FAMOTIDINE 20 MG: 20 TABLET ORAL at 08:40

## 2017-01-01 RX ADMIN — Medication 400 MG: at 17:54

## 2017-01-01 RX ADMIN — FERROUS SULFATE TAB 325 MG (65 MG ELEMENTAL FE) 325 MG: 325 (65 FE) TAB at 18:45

## 2017-01-01 RX ADMIN — LEVETIRACETAM 1000 MG: 500 TABLET, FILM COATED ORAL at 09:10

## 2017-01-01 RX ADMIN — LOPERAMIDE HYDROCHLORIDE 4 MG: 2 CAPSULE ORAL at 14:45

## 2017-01-01 RX ADMIN — Medication 10 ML: at 21:58

## 2017-01-01 RX ADMIN — ACETAMINOPHEN 650 MG: 325 TABLET, FILM COATED ORAL at 17:58

## 2017-01-01 RX ADMIN — PANTOPRAZOLE SODIUM 40 MG: 40 TABLET, DELAYED RELEASE ORAL at 09:09

## 2017-01-01 RX ADMIN — CIPROFLOXACIN HYDROCHLORIDE 750 MG: 250 TABLET, FILM COATED ORAL at 09:34

## 2017-01-01 RX ADMIN — LEVETIRACETAM 1000 MG: 500 TABLET, FILM COATED ORAL at 17:14

## 2017-01-01 RX ADMIN — LOPERAMIDE HYDROCHLORIDE 4 MG: 2 CAPSULE ORAL at 17:29

## 2017-01-01 RX ADMIN — LOPERAMIDE HYDROCHLORIDE 4 MG: 2 CAPSULE ORAL at 13:25

## 2017-01-01 RX ADMIN — METFORMIN HYDROCHLORIDE 500 MG: 500 TABLET, FILM COATED ORAL at 08:40

## 2017-01-01 RX ADMIN — HEPARIN SODIUM 5000 UNITS: 5000 INJECTION, SOLUTION INTRAVENOUS; SUBCUTANEOUS at 21:37

## 2017-01-01 RX ADMIN — FERROUS SULFATE TAB 325 MG (65 MG ELEMENTAL FE) 325 MG: 325 (65 FE) TAB at 07:55

## 2017-01-01 RX ADMIN — LORAZEPAM 0.5 MG: 0.5 TABLET ORAL at 11:34

## 2017-01-01 RX ADMIN — INSULIN LISPRO 3 UNITS: 100 INJECTION, SOLUTION INTRAVENOUS; SUBCUTANEOUS at 16:42

## 2017-01-01 RX ADMIN — CEFTRIAXONE 1 G: 1 INJECTION, POWDER, FOR SOLUTION INTRAMUSCULAR; INTRAVENOUS at 20:43

## 2017-01-01 RX ADMIN — FERROUS SULFATE TAB 325 MG (65 MG ELEMENTAL FE) 325 MG: 325 (65 FE) TAB at 09:09

## 2017-01-01 RX ADMIN — Medication 10 ML: at 21:38

## 2017-01-01 RX ADMIN — FERROUS SULFATE TAB 325 MG (65 MG ELEMENTAL FE) 325 MG: 325 (65 FE) TAB at 08:28

## 2017-01-01 RX ADMIN — LEVETIRACETAM 1000 MG: 500 TABLET, FILM COATED ORAL at 17:29

## 2017-01-01 RX ADMIN — FERROUS SULFATE TAB 325 MG (65 MG ELEMENTAL FE) 325 MG: 325 (65 FE) TAB at 12:07

## 2017-01-01 RX ADMIN — Medication 10 ML: at 05:48

## 2017-01-01 RX ADMIN — MEROPENEM 500 MG: 500 INJECTION, POWDER, FOR SOLUTION INTRAVENOUS at 06:11

## 2017-01-01 RX ADMIN — LOPERAMIDE HYDROCHLORIDE 4 MG: 2 CAPSULE ORAL at 18:50

## 2017-01-01 RX ADMIN — MEROPENEM 500 MG: 500 INJECTION, POWDER, FOR SOLUTION INTRAVENOUS at 12:57

## 2017-01-01 RX ADMIN — KETOROLAC TROMETHAMINE 30 MG: 30 INJECTION, SOLUTION INTRAMUSCULAR at 21:37

## 2017-01-01 RX ADMIN — Medication 1 CAPSULE: at 09:34

## 2017-01-01 RX ADMIN — Medication 10 ML: at 22:30

## 2017-01-01 RX ADMIN — COLLAGENASE SANTYL: 250 OINTMENT TOPICAL at 14:36

## 2017-01-01 RX ADMIN — Medication 1 CAPSULE: at 11:22

## 2017-01-01 RX ADMIN — LEVETIRACETAM 1000 MG: 500 TABLET, FILM COATED ORAL at 08:00

## 2017-01-01 RX ADMIN — Medication 1 CAPSULE: at 08:40

## 2017-01-01 RX ADMIN — Medication 400 MG: at 18:03

## 2017-01-01 RX ADMIN — ACETAMINOPHEN 650 MG: 325 TABLET, FILM COATED ORAL at 14:02

## 2017-01-01 RX ADMIN — Medication 400 MG: at 09:10

## 2017-01-01 RX ADMIN — CIPROFLOXACIN HYDROCHLORIDE 750 MG: 250 TABLET, FILM COATED ORAL at 20:36

## 2017-01-01 RX ADMIN — FERROUS SULFATE TAB 325 MG (65 MG ELEMENTAL FE) 325 MG: 325 (65 FE) TAB at 08:49

## 2017-01-01 RX ADMIN — HEPARIN SODIUM 5000 UNITS: 5000 INJECTION, SOLUTION INTRAVENOUS; SUBCUTANEOUS at 22:12

## 2017-01-01 RX ADMIN — POTASSIUM CHLORIDE 20 MEQ: 20 TABLET, EXTENDED RELEASE ORAL at 17:14

## 2017-01-01 RX ADMIN — SERTRALINE HYDROCHLORIDE 50 MG: 50 TABLET, FILM COATED ORAL at 08:29

## 2017-01-01 RX ADMIN — COLLAGENASE SANTYL: 250 OINTMENT TOPICAL at 13:25

## 2017-01-01 RX ADMIN — LORAZEPAM 0.5 MG: 0.5 TABLET ORAL at 03:05

## 2017-01-01 RX ADMIN — HEPARIN SODIUM 5000 UNITS: 5000 INJECTION, SOLUTION INTRAVENOUS; SUBCUTANEOUS at 05:20

## 2017-01-01 RX ADMIN — INSULIN LISPRO 3 UNITS: 100 INJECTION, SOLUTION INTRAVENOUS; SUBCUTANEOUS at 17:26

## 2017-01-01 RX ADMIN — LOPERAMIDE HYDROCHLORIDE 4 MG: 2 CAPSULE ORAL at 17:26

## 2017-01-01 RX ADMIN — POTASSIUM CHLORIDE 40 MEQ: 750 TABLET, FILM COATED, EXTENDED RELEASE ORAL at 12:55

## 2017-01-01 RX ADMIN — COLLAGENASE SANTYL: 250 OINTMENT TOPICAL at 08:37

## 2017-01-01 RX ADMIN — OXYCODONE HYDROCHLORIDE 5 MG: 5 TABLET ORAL at 02:30

## 2017-01-01 RX ADMIN — Medication 10 ML: at 14:46

## 2017-01-01 RX ADMIN — LEVETIRACETAM 1000 MG: 500 TABLET, FILM COATED ORAL at 09:34

## 2017-01-01 RX ADMIN — METFORMIN HYDROCHLORIDE 500 MG: 500 TABLET, FILM COATED ORAL at 17:15

## 2017-01-01 RX ADMIN — ACETAMINOPHEN 650 MG: 325 TABLET, FILM COATED ORAL at 08:40

## 2017-01-01 RX ADMIN — ACETAMINOPHEN 650 MG: 325 TABLET, FILM COATED ORAL at 11:52

## 2017-01-01 RX ADMIN — INSULIN LISPRO 3 UNITS: 100 INJECTION, SOLUTION INTRAVENOUS; SUBCUTANEOUS at 08:38

## 2017-01-01 RX ADMIN — FERROUS SULFATE TAB 325 MG (65 MG ELEMENTAL FE) 325 MG: 325 (65 FE) TAB at 12:16

## 2017-01-01 RX ADMIN — HEPARIN SODIUM 5000 UNITS: 5000 INJECTION, SOLUTION INTRAVENOUS; SUBCUTANEOUS at 06:35

## 2017-01-01 RX ADMIN — OXYCODONE HYDROCHLORIDE 5 MG: 5 TABLET ORAL at 17:18

## 2017-01-01 RX ADMIN — LORAZEPAM 0.5 MG: 0.5 TABLET ORAL at 04:22

## 2017-01-01 RX ADMIN — Medication 1 CAPSULE: at 08:28

## 2017-01-01 RX ADMIN — Medication 1 CAPSULE: at 10:05

## 2017-01-01 RX ADMIN — LORAZEPAM 0.5 MG: 0.5 TABLET ORAL at 12:43

## 2017-01-01 RX ADMIN — COLLAGENASE SANTYL: 250 OINTMENT TOPICAL at 09:00

## 2017-01-01 RX ADMIN — CEFTRIAXONE 1 G: 1 INJECTION, POWDER, FOR SOLUTION INTRAMUSCULAR; INTRAVENOUS at 22:18

## 2017-01-01 RX ADMIN — LORAZEPAM 0.5 MG: 0.5 TABLET ORAL at 21:28

## 2017-01-01 RX ADMIN — Medication 10 ML: at 14:41

## 2017-01-01 RX ADMIN — ACETAMINOPHEN 650 MG: 325 TABLET, FILM COATED ORAL at 12:37

## 2017-01-01 RX ADMIN — HEPARIN SODIUM 5000 UNITS: 5000 INJECTION, SOLUTION INTRAVENOUS; SUBCUTANEOUS at 21:59

## 2017-01-01 RX ADMIN — POTASSIUM CHLORIDE 40 MEQ: 750 TABLET, FILM COATED, EXTENDED RELEASE ORAL at 17:54

## 2017-01-01 RX ADMIN — LOPERAMIDE HYDROCHLORIDE 4 MG: 2 CAPSULE ORAL at 12:43

## 2017-01-01 RX ADMIN — Medication 10 ML: at 04:38

## 2017-01-01 RX ADMIN — LORAZEPAM 0.5 MG: 0.5 TABLET ORAL at 17:15

## 2017-01-01 RX ADMIN — LOPERAMIDE HYDROCHLORIDE 4 MG: 2 CAPSULE ORAL at 12:37

## 2017-01-01 RX ADMIN — HEPARIN SODIUM 5000 UNITS: 5000 INJECTION, SOLUTION INTRAVENOUS; SUBCUTANEOUS at 13:24

## 2017-01-01 RX ADMIN — SODIUM CHLORIDE 100 ML/HR: 900 INJECTION, SOLUTION INTRAVENOUS at 23:11

## 2017-01-01 RX ADMIN — OXYCODONE HYDROCHLORIDE 5 MG: 5 TABLET ORAL at 08:27

## 2017-01-01 RX ADMIN — OXYCODONE HYDROCHLORIDE 5 MG: 5 TABLET ORAL at 22:33

## 2017-01-01 RX ADMIN — Medication 10 ML: at 13:26

## 2017-01-01 RX ADMIN — CIPROFLOXACIN HYDROCHLORIDE 750 MG: 250 TABLET, FILM COATED ORAL at 21:58

## 2017-01-01 RX ADMIN — SODIUM CHLORIDE 100 ML/HR: 900 INJECTION, SOLUTION INTRAVENOUS at 09:08

## 2017-01-01 RX ADMIN — HEPARIN SODIUM 5000 UNITS: 5000 INJECTION, SOLUTION INTRAVENOUS; SUBCUTANEOUS at 05:12

## 2017-01-01 RX ADMIN — ACETAMINOPHEN 650 MG: 325 TABLET, FILM COATED ORAL at 22:34

## 2017-01-01 RX ADMIN — OXYCODONE HYDROCHLORIDE 5 MG: 5 TABLET ORAL at 01:43

## 2017-01-01 RX ADMIN — LORAZEPAM 0.5 MG: 0.5 TABLET ORAL at 14:34

## 2017-01-01 RX ADMIN — FERROUS SULFATE TAB 325 MG (65 MG ELEMENTAL FE) 325 MG: 325 (65 FE) TAB at 17:26

## 2017-01-01 RX ADMIN — SERTRALINE HYDROCHLORIDE 50 MG: 50 TABLET, FILM COATED ORAL at 09:00

## 2017-01-01 RX ADMIN — HEPARIN SODIUM 5000 UNITS: 5000 INJECTION, SOLUTION INTRAVENOUS; SUBCUTANEOUS at 22:35

## 2017-01-01 RX ADMIN — LORAZEPAM 0.5 MG: 0.5 TABLET ORAL at 01:51

## 2017-01-01 RX ADMIN — LEVETIRACETAM 1000 MG: 500 TABLET, FILM COATED ORAL at 08:34

## 2017-01-01 RX ADMIN — Medication 10 ML: at 05:54

## 2017-01-01 RX ADMIN — Medication 400 MG: at 08:16

## 2017-01-01 RX ADMIN — LEVETIRACETAM 1000 MG: 500 TABLET, FILM COATED ORAL at 10:03

## 2017-01-01 RX ADMIN — LOPERAMIDE HYDROCHLORIDE 4 MG: 2 CAPSULE ORAL at 00:57

## 2017-01-01 RX ADMIN — Medication 400 MG: at 11:22

## 2017-01-01 RX ADMIN — LORAZEPAM 0.5 MG: 2 INJECTION INTRAMUSCULAR; INTRAVENOUS at 09:17

## 2017-01-01 RX ADMIN — FERROUS SULFATE TAB 325 MG (65 MG ELEMENTAL FE) 325 MG: 325 (65 FE) TAB at 17:14

## 2017-01-01 RX ADMIN — COLLAGENASE SANTYL: 250 OINTMENT TOPICAL at 08:01

## 2017-01-01 RX ADMIN — CIPROFLOXACIN HYDROCHLORIDE 750 MG: 250 TABLET, FILM COATED ORAL at 08:49

## 2017-01-01 RX ADMIN — LOPERAMIDE HYDROCHLORIDE 4 MG: 2 CAPSULE ORAL at 22:23

## 2017-01-01 RX ADMIN — FERROUS SULFATE TAB 325 MG (65 MG ELEMENTAL FE) 325 MG: 325 (65 FE) TAB at 08:36

## 2017-01-01 RX ADMIN — HEPARIN SODIUM 5000 UNITS: 5000 INJECTION, SOLUTION INTRAVENOUS; SUBCUTANEOUS at 07:29

## 2017-01-01 RX ADMIN — HEPARIN SODIUM 5000 UNITS: 5000 INJECTION, SOLUTION INTRAVENOUS; SUBCUTANEOUS at 22:24

## 2017-01-01 RX ADMIN — LEVETIRACETAM 1000 MG: 500 TABLET, FILM COATED ORAL at 08:40

## 2017-01-01 RX ADMIN — Medication 10 ML: at 23:11

## 2017-01-01 RX ADMIN — HEPARIN SODIUM 5000 UNITS: 5000 INJECTION, SOLUTION INTRAVENOUS; SUBCUTANEOUS at 06:11

## 2017-01-01 RX ADMIN — SODIUM CHLORIDE 250 ML: 900 INJECTION, SOLUTION INTRAVENOUS at 15:18

## 2017-01-01 RX ADMIN — Medication 10 ML: at 16:10

## 2017-01-01 RX ADMIN — Medication 1 CAPSULE: at 08:12

## 2017-01-01 RX ADMIN — LORAZEPAM 0.5 MG: 0.5 TABLET ORAL at 18:36

## 2017-01-01 RX ADMIN — HEPARIN SODIUM 5000 UNITS: 5000 INJECTION, SOLUTION INTRAVENOUS; SUBCUTANEOUS at 15:10

## 2017-01-01 RX ADMIN — LORAZEPAM 0.5 MG: 0.5 TABLET ORAL at 19:59

## 2017-01-01 RX ADMIN — HEPARIN SODIUM 5000 UNITS: 5000 INJECTION, SOLUTION INTRAVENOUS; SUBCUTANEOUS at 21:31

## 2017-01-01 RX ADMIN — CIPROFLOXACIN HYDROCHLORIDE 750 MG: 250 TABLET, FILM COATED ORAL at 22:26

## 2017-01-01 RX ADMIN — INSULIN LISPRO 2 UNITS: 100 INJECTION, SOLUTION INTRAVENOUS; SUBCUTANEOUS at 08:28

## 2017-01-01 RX ADMIN — FERROUS SULFATE TAB 325 MG (65 MG ELEMENTAL FE) 325 MG: 325 (65 FE) TAB at 12:55

## 2017-01-01 RX ADMIN — LEVETIRACETAM 1000 MG: 500 TABLET, FILM COATED ORAL at 08:49

## 2017-01-01 RX ADMIN — Medication 10 ML: at 21:34

## 2017-01-01 RX ADMIN — OXYCODONE HYDROCHLORIDE 5 MG: 5 TABLET ORAL at 21:18

## 2017-01-01 RX ADMIN — LEVETIRACETAM 1000 MG: 500 TABLET, FILM COATED ORAL at 18:44

## 2017-01-01 RX ADMIN — METFORMIN HYDROCHLORIDE 500 MG: 500 TABLET, FILM COATED ORAL at 08:49

## 2017-01-01 RX ADMIN — LORAZEPAM 0.5 MG: 0.5 TABLET ORAL at 00:36

## 2017-01-01 RX ADMIN — CIPROFLOXACIN HYDROCHLORIDE 750 MG: 250 TABLET, FILM COATED ORAL at 21:26

## 2017-01-01 RX ADMIN — HEPARIN SODIUM 5000 UNITS: 5000 INJECTION, SOLUTION INTRAVENOUS; SUBCUTANEOUS at 22:18

## 2017-01-01 RX ADMIN — Medication 400 MG: at 18:45

## 2017-01-01 RX ADMIN — LOPERAMIDE HYDROCHLORIDE 4 MG: 2 CAPSULE ORAL at 09:34

## 2017-01-01 RX ADMIN — Medication 10 ML: at 05:57

## 2017-01-01 RX ADMIN — Medication 1 CAPSULE: at 08:00

## 2017-01-01 RX ADMIN — HEPARIN SODIUM 5000 UNITS: 5000 INJECTION, SOLUTION INTRAVENOUS; SUBCUTANEOUS at 06:00

## 2017-01-01 RX ADMIN — ACETAMINOPHEN 650 MG: 325 TABLET, FILM COATED ORAL at 01:52

## 2017-01-01 RX ADMIN — COLLAGENASE SANTYL: 250 OINTMENT TOPICAL at 09:06

## 2017-01-01 RX ADMIN — HEPARIN SODIUM 5000 UNITS: 5000 INJECTION, SOLUTION INTRAVENOUS; SUBCUTANEOUS at 06:24

## 2017-01-01 RX ADMIN — INSULIN LISPRO 4 UNITS: 100 INJECTION, SOLUTION INTRAVENOUS; SUBCUTANEOUS at 12:07

## 2017-01-01 RX ADMIN — FAMOTIDINE 20 MG: 20 TABLET ORAL at 08:00

## 2017-01-01 RX ADMIN — LOPERAMIDE HYDROCHLORIDE 4 MG: 2 CAPSULE ORAL at 08:28

## 2017-01-01 RX ADMIN — HEPARIN SODIUM 5000 UNITS: 5000 INJECTION, SOLUTION INTRAVENOUS; SUBCUTANEOUS at 14:46

## 2017-01-01 RX ADMIN — SODIUM CHLORIDE 250 ML: 900 INJECTION, SOLUTION INTRAVENOUS at 01:29

## 2017-01-01 RX ADMIN — Medication 400 MG: at 10:05

## 2017-01-01 RX ADMIN — LORAZEPAM 0.5 MG: 0.5 TABLET ORAL at 08:28

## 2017-01-01 RX ADMIN — SERTRALINE HYDROCHLORIDE 50 MG: 50 TABLET ORAL at 11:22

## 2017-01-01 RX ADMIN — MAGNESIUM SULFATE IN WATER 2 G: 40 INJECTION, SOLUTION INTRAVENOUS at 12:58

## 2017-01-01 RX ADMIN — METFORMIN HYDROCHLORIDE 500 MG: 500 TABLET, FILM COATED ORAL at 08:13

## 2017-01-01 RX ADMIN — LORAZEPAM 0.5 MG: 0.5 TABLET ORAL at 22:23

## 2017-01-01 RX ADMIN — METFORMIN HYDROCHLORIDE 500 MG: 500 TABLET, FILM COATED ORAL at 17:29

## 2017-01-01 RX ADMIN — HEPARIN SODIUM 5000 UNITS: 5000 INJECTION, SOLUTION INTRAVENOUS; SUBCUTANEOUS at 15:23

## 2017-01-01 RX ADMIN — Medication 10 ML: at 06:55

## 2017-01-01 RX ADMIN — LEVETIRACETAM 1000 MG: 500 TABLET, FILM COATED ORAL at 08:36

## 2017-01-01 RX ADMIN — FERROUS SULFATE TAB 325 MG (65 MG ELEMENTAL FE) 325 MG: 325 (65 FE) TAB at 17:54

## 2017-01-01 RX ADMIN — Medication 10 ML: at 22:10

## 2017-01-01 RX ADMIN — HEPARIN SODIUM 5000 UNITS: 5000 INJECTION, SOLUTION INTRAVENOUS; SUBCUTANEOUS at 17:14

## 2017-01-01 RX ADMIN — ACETAMINOPHEN 650 MG: 325 TABLET, FILM COATED ORAL at 09:41

## 2017-01-01 RX ADMIN — COLLAGENASE SANTYL: 250 OINTMENT TOPICAL at 09:10

## 2017-01-01 RX ADMIN — LOPERAMIDE HYDROCHLORIDE 4 MG: 2 CAPSULE ORAL at 13:05

## 2017-01-01 RX ADMIN — INSULIN LISPRO 5 UNITS: 100 INJECTION, SOLUTION INTRAVENOUS; SUBCUTANEOUS at 00:23

## 2017-01-01 RX ADMIN — Medication 10 ML: at 21:30

## 2017-01-01 RX ADMIN — POTASSIUM CHLORIDE 20 MEQ: 20 TABLET, EXTENDED RELEASE ORAL at 08:13

## 2017-01-01 RX ADMIN — FERROUS SULFATE TAB 325 MG (65 MG ELEMENTAL FE) 325 MG: 325 (65 FE) TAB at 17:16

## 2017-01-01 RX ADMIN — LEVETIRACETAM 1000 MG: 500 TABLET, FILM COATED ORAL at 11:22

## 2017-01-01 RX ADMIN — INSULIN LISPRO 3 UNITS: 100 INJECTION, SOLUTION INTRAVENOUS; SUBCUTANEOUS at 12:14

## 2017-01-01 RX ADMIN — COLLAGENASE SANTYL: 250 OINTMENT TOPICAL at 09:08

## 2017-01-01 RX ADMIN — METFORMIN HYDROCHLORIDE 500 MG: 500 TABLET, FILM COATED ORAL at 08:28

## 2017-01-01 RX ADMIN — SODIUM CHLORIDE 100 ML/HR: 900 INJECTION, SOLUTION INTRAVENOUS at 22:16

## 2017-01-01 RX ADMIN — COLLAGENASE SANTYL: 250 OINTMENT TOPICAL at 11:23

## 2017-01-01 RX ADMIN — FERROUS SULFATE TAB 325 MG (65 MG ELEMENTAL FE) 325 MG: 325 (65 FE) TAB at 14:46

## 2017-01-01 RX ADMIN — LEVETIRACETAM 1000 MG: 500 TABLET, FILM COATED ORAL at 18:03

## 2017-01-01 RX ADMIN — INSULIN LISPRO 4 UNITS: 100 INJECTION, SOLUTION INTRAVENOUS; SUBCUTANEOUS at 12:58

## 2017-01-01 RX ADMIN — Medication 10 ML: at 21:32

## 2017-01-01 RX ADMIN — LOPERAMIDE HYDROCHLORIDE 4 MG: 2 CAPSULE ORAL at 16:07

## 2017-01-01 RX ADMIN — LEVETIRACETAM 1000 MG: 500 TABLET, FILM COATED ORAL at 08:28

## 2017-01-01 RX ADMIN — LEVETIRACETAM 500 MG: 500 TABLET ORAL at 08:16

## 2017-01-01 RX ADMIN — CIPROFLOXACIN HYDROCHLORIDE 750 MG: 250 TABLET, FILM COATED ORAL at 08:40

## 2017-01-01 RX ADMIN — CEFTRIAXONE 1 G: 1 INJECTION, POWDER, FOR SOLUTION INTRAMUSCULAR; INTRAVENOUS at 00:21

## 2017-01-01 RX ADMIN — SERTRALINE HYDROCHLORIDE 50 MG: 50 TABLET, FILM COATED ORAL at 08:00

## 2017-01-01 RX ADMIN — COLLAGENASE SANTYL: 250 OINTMENT TOPICAL at 12:43

## 2017-01-01 RX ADMIN — FAMOTIDINE 20 MG: 20 TABLET ORAL at 08:28

## 2017-01-01 RX ADMIN — PANTOPRAZOLE SODIUM 40 MG: 40 TABLET, DELAYED RELEASE ORAL at 08:16

## 2017-01-01 RX ADMIN — POTASSIUM CHLORIDE 20 MEQ: 20 TABLET, EXTENDED RELEASE ORAL at 08:00

## 2017-01-01 RX ADMIN — OXYCODONE HYDROCHLORIDE 5 MG: 5 TABLET ORAL at 10:14

## 2017-01-01 RX ADMIN — Medication 10 ML: at 07:30

## 2017-01-01 RX ADMIN — OXYCODONE HYDROCHLORIDE 5 MG: 5 TABLET ORAL at 15:21

## 2017-01-01 RX ADMIN — CIPROFLOXACIN HYDROCHLORIDE 750 MG: 250 TABLET, FILM COATED ORAL at 08:37

## 2017-01-01 RX ADMIN — LEVETIRACETAM 1000 MG: 500 TABLET, FILM COATED ORAL at 16:07

## 2017-01-01 RX ADMIN — FERROUS SULFATE TAB 325 MG (65 MG ELEMENTAL FE) 325 MG: 325 (65 FE) TAB at 13:25

## 2017-01-01 RX ADMIN — LEVETIRACETAM 1000 MG: 500 TABLET, FILM COATED ORAL at 17:53

## 2017-01-01 RX ADMIN — LORAZEPAM 0.5 MG: 0.5 TABLET ORAL at 18:37

## 2017-01-01 RX ADMIN — LEVETIRACETAM 1000 MG: 500 TABLET, FILM COATED ORAL at 08:13

## 2017-01-01 RX ADMIN — OXYCODONE HYDROCHLORIDE 5 MG: 5 TABLET ORAL at 16:17

## 2017-01-01 RX ADMIN — POTASSIUM CHLORIDE 20 MEQ: 20 TABLET, EXTENDED RELEASE ORAL at 19:52

## 2017-01-01 RX ADMIN — FERROUS SULFATE TAB 325 MG (65 MG ELEMENTAL FE) 325 MG: 325 (65 FE) TAB at 18:03

## 2017-01-01 RX ADMIN — LORAZEPAM 0.5 MG: 0.5 TABLET ORAL at 22:34

## 2017-01-01 RX ADMIN — Medication 10 ML: at 06:28

## 2017-01-01 RX ADMIN — METFORMIN HYDROCHLORIDE 500 MG: 500 TABLET, FILM COATED ORAL at 16:07

## 2017-01-01 RX ADMIN — LOPERAMIDE HYDROCHLORIDE 4 MG: 2 CAPSULE ORAL at 07:23

## 2017-01-01 RX ADMIN — HEPARIN SODIUM 5000 UNITS: 5000 INJECTION, SOLUTION INTRAVENOUS; SUBCUTANEOUS at 06:45

## 2017-01-01 RX ADMIN — HEPARIN SODIUM 5000 UNITS: 5000 INJECTION, SOLUTION INTRAVENOUS; SUBCUTANEOUS at 05:48

## 2017-01-01 RX ADMIN — LOPERAMIDE HYDROCHLORIDE 4 MG: 2 CAPSULE ORAL at 22:34

## 2017-01-01 RX ADMIN — FERROUS SULFATE TAB 325 MG (65 MG ELEMENTAL FE) 325 MG: 325 (65 FE) TAB at 12:37

## 2017-01-01 RX ADMIN — ACETAMINOPHEN 650 MG: 325 TABLET, FILM COATED ORAL at 09:16

## 2017-01-01 RX ADMIN — HEPARIN SODIUM 5000 UNITS: 5000 INJECTION, SOLUTION INTRAVENOUS; SUBCUTANEOUS at 15:18

## 2017-01-01 RX ADMIN — OXYCODONE HYDROCHLORIDE 5 MG: 5 TABLET ORAL at 14:32

## 2017-01-01 RX ADMIN — FERROUS SULFATE TAB 325 MG (65 MG ELEMENTAL FE) 325 MG: 325 (65 FE) TAB at 09:35

## 2017-01-01 RX ADMIN — POTASSIUM CHLORIDE 40 MEQ: 750 TABLET, FILM COATED, EXTENDED RELEASE ORAL at 14:45

## 2017-01-01 RX ADMIN — OXYCODONE HYDROCHLORIDE 5 MG: 5 TABLET ORAL at 06:20

## 2017-01-01 RX ADMIN — POTASSIUM CHLORIDE 20 MEQ: 20 TABLET, EXTENDED RELEASE ORAL at 08:34

## 2017-01-01 RX ADMIN — Medication 1 CAPSULE: at 08:49

## 2017-01-01 RX ADMIN — ACETAMINOPHEN 650 MG: 325 TABLET, FILM COATED ORAL at 16:41

## 2017-01-01 RX ADMIN — OXYCODONE HYDROCHLORIDE 5 MG: 5 TABLET ORAL at 13:05

## 2017-01-01 RX ADMIN — METFORMIN HYDROCHLORIDE 500 MG: 500 TABLET, FILM COATED ORAL at 09:35

## 2017-01-01 RX ADMIN — Medication 10 ML: at 22:18

## 2017-01-01 RX ADMIN — FAMOTIDINE 20 MG: 20 TABLET ORAL at 08:13

## 2017-01-01 RX ADMIN — LEVETIRACETAM 1000 MG: 500 TABLET, FILM COATED ORAL at 17:54

## 2017-01-01 RX ADMIN — Medication 10 ML: at 22:35

## 2017-01-01 RX ADMIN — CIPROFLOXACIN HYDROCHLORIDE 750 MG: 250 TABLET, FILM COATED ORAL at 08:13

## 2017-01-01 RX ADMIN — PANTOPRAZOLE SODIUM 40 MG: 40 TABLET, DELAYED RELEASE ORAL at 10:05

## 2017-01-01 RX ADMIN — FAMOTIDINE 20 MG: 20 TABLET ORAL at 08:36

## 2017-01-01 RX ADMIN — LOPERAMIDE HYDROCHLORIDE 4 MG: 2 CAPSULE ORAL at 08:40

## 2017-01-01 RX ADMIN — SERTRALINE HYDROCHLORIDE 50 MG: 50 TABLET ORAL at 08:16

## 2017-01-01 RX ADMIN — FERROUS SULFATE TAB 325 MG (65 MG ELEMENTAL FE) 325 MG: 325 (65 FE) TAB at 08:16

## 2017-01-01 RX ADMIN — POTASSIUM CHLORIDE 20 MEQ: 20 TABLET, EXTENDED RELEASE ORAL at 09:35

## 2017-01-01 RX ADMIN — FAMOTIDINE 20 MG: 20 TABLET ORAL at 09:35

## 2017-01-01 RX ADMIN — LEVETIRACETAM 1000 MG: 500 TABLET, FILM COATED ORAL at 17:16

## 2017-01-01 RX ADMIN — METFORMIN HYDROCHLORIDE 500 MG: 500 TABLET, FILM COATED ORAL at 17:16

## 2017-01-01 RX ADMIN — POTASSIUM CHLORIDE 20 MEQ: 20 TABLET, EXTENDED RELEASE ORAL at 08:49

## 2017-01-01 RX ADMIN — FERROUS SULFATE TAB 325 MG (65 MG ELEMENTAL FE) 325 MG: 325 (65 FE) TAB at 10:05

## 2017-01-01 RX ADMIN — FERROUS SULFATE TAB 325 MG (65 MG ELEMENTAL FE) 325 MG: 325 (65 FE) TAB at 08:40

## 2017-01-01 RX ADMIN — HEPARIN SODIUM 5000 UNITS: 5000 INJECTION, SOLUTION INTRAVENOUS; SUBCUTANEOUS at 15:22

## 2017-01-01 RX ADMIN — MEROPENEM 500 MG: 500 INJECTION, POWDER, FOR SOLUTION INTRAVENOUS at 11:26

## 2017-01-01 RX ADMIN — CIPROFLOXACIN HYDROCHLORIDE 750 MG: 250 TABLET, FILM COATED ORAL at 21:36

## 2017-01-01 RX ADMIN — POTASSIUM CHLORIDE 20 MEQ: 20 TABLET, EXTENDED RELEASE ORAL at 09:16

## 2017-01-01 RX ADMIN — PANTOPRAZOLE SODIUM 40 MG: 40 TABLET, DELAYED RELEASE ORAL at 11:22

## 2017-01-01 RX ADMIN — INSULIN LISPRO 3 UNITS: 100 INJECTION, SOLUTION INTRAVENOUS; SUBCUTANEOUS at 12:16

## 2017-01-01 RX ADMIN — LEVETIRACETAM 1000 MG: 500 TABLET, FILM COATED ORAL at 18:45

## 2017-01-01 RX ADMIN — FERROUS SULFATE TAB 325 MG (65 MG ELEMENTAL FE) 325 MG: 325 (65 FE) TAB at 12:59

## 2017-01-01 RX ADMIN — LOPERAMIDE HYDROCHLORIDE 4 MG: 2 CAPSULE ORAL at 12:59

## 2017-01-01 RX ADMIN — Medication 10 ML: at 15:25

## 2017-01-01 RX ADMIN — POTASSIUM CHLORIDE 20 MEQ: 20 TABLET, EXTENDED RELEASE ORAL at 08:28

## 2017-01-01 RX ADMIN — INSULIN LISPRO 3 UNITS: 100 INJECTION, SOLUTION INTRAVENOUS; SUBCUTANEOUS at 08:13

## 2017-01-01 RX ADMIN — SODIUM CHLORIDE 100 ML/HR: 900 INJECTION, SOLUTION INTRAVENOUS at 11:35

## 2017-01-01 RX ADMIN — LEVETIRACETAM 1000 MG: 500 TABLET, FILM COATED ORAL at 17:46

## 2017-01-01 RX ADMIN — LOPERAMIDE HYDROCHLORIDE 4 MG: 2 CAPSULE ORAL at 11:22

## 2017-01-01 RX ADMIN — Medication 1 CAPSULE: at 08:36

## 2017-01-01 RX ADMIN — Medication 10 ML: at 06:12

## 2017-01-01 RX ADMIN — ACETAMINOPHEN 650 MG: 325 TABLET, FILM COATED ORAL at 08:13

## 2017-01-01 RX ADMIN — LORAZEPAM 0.5 MG: 0.5 TABLET ORAL at 21:18

## 2017-01-01 RX ADMIN — MEROPENEM 500 MG: 500 INJECTION, POWDER, FOR SOLUTION INTRAVENOUS at 21:31

## 2017-01-01 RX ADMIN — Medication 10 ML: at 06:13

## 2017-01-01 RX ADMIN — SODIUM CHLORIDE 1000 ML: 900 INJECTION, SOLUTION INTRAVENOUS at 20:45

## 2017-01-01 RX ADMIN — LORAZEPAM 0.5 MG: 0.5 TABLET ORAL at 08:13

## 2017-01-01 RX ADMIN — CIPROFLOXACIN HYDROCHLORIDE 750 MG: 250 TABLET, FILM COATED ORAL at 22:34

## 2017-01-01 RX ADMIN — POTASSIUM CHLORIDE 20 MEQ: 20 TABLET, EXTENDED RELEASE ORAL at 08:40

## 2017-01-01 RX ADMIN — HEPARIN SODIUM 5000 UNITS: 5000 INJECTION, SOLUTION INTRAVENOUS; SUBCUTANEOUS at 05:53

## 2017-01-01 RX ADMIN — CEFTRIAXONE 1 G: 1 INJECTION, POWDER, FOR SOLUTION INTRAMUSCULAR; INTRAVENOUS at 22:12

## 2017-01-01 RX ADMIN — LOPERAMIDE HYDROCHLORIDE 4 MG: 2 CAPSULE ORAL at 08:27

## 2017-01-01 RX ADMIN — Medication 10 ML: at 22:40

## 2017-01-01 RX ADMIN — HEPARIN SODIUM 5000 UNITS: 5000 INJECTION, SOLUTION INTRAVENOUS; SUBCUTANEOUS at 14:00

## 2017-01-01 RX ADMIN — SERTRALINE HYDROCHLORIDE 50 MG: 50 TABLET ORAL at 09:09

## 2017-01-01 RX ADMIN — HEPARIN SODIUM 5000 UNITS: 5000 INJECTION, SOLUTION INTRAVENOUS; SUBCUTANEOUS at 22:26

## 2017-01-01 RX ADMIN — LEVETIRACETAM 1000 MG: 500 TABLET, FILM COATED ORAL at 18:08

## 2017-01-01 RX ADMIN — LOPERAMIDE HYDROCHLORIDE 4 MG: 2 CAPSULE ORAL at 00:48

## 2017-01-08 PROBLEM — N39.0 UTI (URINARY TRACT INFECTION): Status: ACTIVE | Noted: 2017-01-01

## 2017-01-08 NOTE — IP AVS SNAPSHOT
[address] [facility] 83. 
[phone] Patient: Campbell Taveras MRN: [id number] You are allergic to the following No active allergies Recent Documentation Height Weight BMI OB Status Smoking Status 1.651 m 54.4 kg 19.97 kg/m2 Postmenopausal Former Smoker Emergency Contacts  (Rel.) Home Phone Work Phone Mobile Phone Jerica Jain (Other Relative) [phone] -- [phone] Adina Baltazar (Other Relative) [phone] -- -- About your hospitalization You were admitted on:  January 8, 2017 You last received care in the:  [facility] You were discharged on:  January 20, 2017 Why you were hospitalized Your primary diagnosis was:  Not on File Your diagnoses also included:  Uti (Urinary Tract Infection) Providers Seen During Your Hospitalizations Provider Role Specialty Primary office phone Luis F Perez MD Attending Provider Emergency Medicine [phone] Duane Ramirez MD Attending Provider Internal Medicine [phone] Anson Zhang MD Attending Provider Internal Medicine [phone] Your Primary Care Physician (PCP) Primary Care Physician Office Phone Office Fax Ramin Perez [phone] Follow-up Information Follow up With Details Comments Contact Fercho Ashton MD In 1 week  [facility] [address] [place] [address] 
[phone] Current Discharge Medication List  
  
START taking these medications Dose & Instructions Dispensing Information Comments Morning Noon Evening Bedtime L. acidoph & paracasei- S therm- Bifido 8 billion cell Cap cap Commonly known as:  LINSEY-Q/RISAQUAD Your next dose is: Today, Tomorrow Other:  _________ Dose:  1 Cap Take 1 Cap by mouth daily. Quantity:  30 Cap Refills:  0  
     
   
   
   
  
 loperamide 2 mg capsule Commonly known as:  IMODIUM Your next dose is: Today, Tomorrow Other:  _________ Dose:  4 mg Take 2 Caps by mouth every four (4) hours as needed for Diarrhea for up to 10 days. Quantity:  30 Cap Refills:  0  
     
   
   
   
  
 oxyCODONE IR 5 mg immediate release tablet Commonly known as:  Genie Upshur Your next dose is: Today, Tomorrow Other:  _________ Dose:  5 mg Take 1 Tab by mouth every four (4) hours as needed. Max Daily Amount: 30 mg.  
 Quantity:  10 Tab Refills:  0 CONTINUE these medications which have CHANGED Dose & Instructions Dispensing Information Comments Morning Noon Evening Bedtime  
 levETIRAcetam 1,000 mg tablet What changed:   
- medication strength 
- how much to take Your next dose is: Today, Tomorrow Other:  _________ Dose:  1000 mg Take 1 Tab by mouth two (2) times a day. Quantity:  60 Tab Refills:  0  
     
   
   
   
  
 ondansetron 8 mg disintegrating tablet Commonly known as:  ZOFRAN ODT What changed:  Another medication with the same name was removed. Continue taking this medication, and follow the directions you see here. Your next dose is: Today, Tomorrow Other:  _________ Dose:  8 mg Take 1 Tab by mouth every eight (8) hours as needed for Nausea. Quantity:  15 Tab Refills:  0  
     
   
   
   
  
 sertraline 50 mg tablet Commonly known as:  ZOLOFT What changed:   
- how much to take 
- additional instructions Your next dose is: Today, Tomorrow Other:  _________ Dose:  50 mg Take 1 Tab by mouth daily. Holding while on Cipro, restart 11/18 Quantity:  30 Tab Refills:  0 CONTINUE these medications which have NOT CHANGED Dose & Instructions Dispensing Information Comments Morning Noon Evening Bedtime  
 collagenase 250 unit/gram ointment Commonly known as:  SANTYL Your next dose is: Today, Tomorrow Other:  _________ Apply  to affected area three (3) times daily. Apply to sacral wound every shift Refills:  0  
     
   
   
   
  
 ferrous sulfate 325 mg (65 mg iron) tablet Your next dose is: Today, Tomorrow Other:  _________ Dose:  325 mg Take 325 mg by mouth three (3) times daily (with meals). Refills:  0  
     
   
   
   
  
 glucose 4 gram chewable tablet Your next dose is: Today, Tomorrow Other:  _________ Dose:  4 Tab Take 4 Tabs by mouth as needed. Quantity:  30 Tab Refills:  2 LORazepam 0.5 mg tablet Commonly known as:  ATIVAN Your next dose is: Today, Tomorrow Other:  _________ Dose:  0.5 mg Take 1 Tab by mouth every eight (8) hours as needed for Anxiety. Max Daily Amount: 1.5 mg.  
 Quantity:  10 Tab Refills:  0  
     
   
   
   
  
 omeprazole 20 mg capsule Commonly known as:  PRILOSEC Your next dose is: Today, Tomorrow Other:  _________ Dose:  20 mg Take 20 mg by mouth daily. Refills:  0 STOP taking these medications   
 insulin lispro 100 unit/mL injection Commonly known as:  HUMALOG MAALOX MAXIMUM STRENGTH 400-400-40 mg/5 mL suspension Generic drug:  aluminum & magnesium hydroxide-simethicone  
   
  
 magnesium oxide 400 mg tablet Commonly known as:  MAG-OX  
   
  
 metFORMIN 500 mg tablet Commonly known as:  GLUCOPHAGE  
   
  
 oxyCODONE-acetaminophen 5-325 mg per tablet Commonly known as:  PERCOCET  
   
  
 vancomycin 125 mg capsule Commonly known as:  VANCOCIN  
   
  
  
ASK your doctor about these medications Dose & Instructions Dispensing Information Comments Morning Noon Evening Bedtime  
 ciprofloxacin HCl 750 mg tablet Commonly known as:  CIPRO Ask about: Should I take this medication? Your next dose is: Today, Tomorrow Other:  _________ Dose:  750 mg Take 1 Tab by mouth every twelve (12) hours for 4 days. Quantity:  9 Tab Refills:  0 Where to Get Your Medications Information on where to get these meds will be given to you by the nurse or doctor. ! Ask your nurse or doctor about these medications  
  ciprofloxacin HCl 750 mg tablet L. acidoph & paracasei- S therm- Bifido 8 billion cell Cap cap  
 levETIRAcetam 1,000 mg tablet  
 loperamide 2 mg capsule LORazepam 0.5 mg tablet  
 oxyCODONE IR 5 mg immediate release tablet  
 sertraline 50 mg tablet Discharge Instructions HOSPITALIST DISCHARGE INSTRUCTIONS 
 
NAME: Julissa Santiago :  1960 MRN:  [id number] Date/Time:  2017 8:33 AM 
 
ADMIT DATE: 2017 DISCHARGE DATE: 2017 Attending Physician: Lenin London MD 
 
DISCHARGE DIAGNOSIS: 
 
UTI Encephalopathy Medications: Per above medication reconciliation. Pain Management: per above medications Recommended diet: Diabetic Diet Recommended activity: Activity as tolerated and PT/OT Eval and Treat Wound care:  
Apply Betadine swab to the left heel and allow it to dry. No other dressing needed. Sacrum Wound care to be done daily - Cleanse the wound with NS and wipe with gauze to remove the old drainage and wound debris. Apply wound gel to the Kerlix gauze and then pack the wound including the undermining area from 4 - 7 o'clock. Cover with sacral foam dressing. Daily Right HIP wound care: Cleanse with Normal Saline and wipe with gauze to remove the old drainage and wound debris. Apply a small amount of wound gel (Carrasyn) to some Kerlix gauze to pack the wound and cover with a foam dressing. Daily wound care for the LEFT top of the foot: Cleanse the wound with Normal Saline and wipe with gauze to remove the old drainage and wound debris. Apply a thick layer of the Santyl and then cover with a NS moistened Kerlix to fill the wound and then cover with a foam dressing. Document a \"1\" at the top column of the wound flowsheet for debridement wound care. Indwelling devices: Snowden catheter, chronic with care per routine Supplemental Oxygen: None Required Lab work:    Difficult stick for blood draw. Glucose management:   Patient develops hypoglycemia even with low doses of insulin. Check FBG  Twice weekly. If >180, inform MD 
 
Code status: Full Outside physician follow up: Follow-up Information Follow up With Details Comments Contact Info Renate Ashton MD In 1 week  [facility] [address] [place] [address] 
[phone] Skilled nursing facility/ SNF MD responsible for above on discharge. Information obtained by : 
I understand that if any problems occur once I am at home I am to contact my physician. I understand and acknowledge receipt of the instructions indicated above. Physician's or R.N.'s Signature                                                                  Date/Time Patient or Repres Discharge Orders None [facility] Announcement We are excited to announce that we are making your provider's discharge notes available to you in [employer]. You will see these notes when they are completed and signed by the physician that discharged you from your recent hospital stay.   If you have any questions or concerns about any information you see in VeriShow, please call the Health Information Department where you were seen or reach out to your Primary Care Provider for more information about your plan of care. Introducing [facility]! Precious Morales introduces VeriShow patient portal. Now you can access parts of your medical record, email your doctor's office, and request medication refills online. 1. In your internet browser, go to [email or web address]. [email or web address] 2. Click on the First Time User? Click Here link in the Sign In box. You will see the New Member Sign Up page. 3. Enter your VeriShow Access Code exactly as it appears below. You will not need to use this code after youve completed the sign-up process. If you do not sign up before the expiration date, you must request a new code. · VeriShow Access Code: [email or web address] Expires: 4/8/2017  5:56 PM 
 
4. Enter the last four digits of your Social Security Number (xxxx) and Date of Birth (mm/dd/yyyy) as indicated and click Submit. You will be taken to the next sign-up page. 5. Create a VeriShow ID. This will be your VeriShow login ID and cannot be changed, so think of one that is secure and easy to remember. 6. Create a VeriShow password. You can change your password at any time. 7. Enter your Password Reset Question and Answer. This can be used at a later time if you forget your password. 8. Enter your e-mail address. You will receive e-mail notification when new information is available in [address] 9. Click Sign Up. You can now view and download portions of your medical record. 10. Click the Download Summary menu link to download a portable copy of your medical information. If you have questions, please visit the Frequently Asked Questions section of the VeriShow website. Remember, VeriShow is NOT to be used for urgent needs. For medical emergencies, dial 911. Now available from your iPhone and Android! General Information Please provide this summary of care documentation to your next provider. Patient Signature:  ____________________________________________________________ Date:  ____________________________________________________________  
  
Orbie Soares Provider Signature:  ____________________________________________________________ Date:  ____________________________________________________________

## 2017-01-08 NOTE — ED NOTES
At bedside to obtain EKG, lab specimens, and urine as ordered. Pt refuses villaseñor catheter change, pt now states she has only had catheter x1-2 days. Explained benefits of changing catheter to pt and potential risks of leaving existing catheter in place. Pt continues to refuse to have catheter changed. Spoke with Dr. Jordan Talavera and made him aware of pt's statements.

## 2017-01-08 NOTE — ED NOTES
Assumed care of pt upon EMS arrival. EMS reports they were called by pt's niece who had concern that pt was on the floor. Pt denies fall, states she was lying on the floor because she wanted to lie on the floor. Pt with numerous pressure wounds. Stage 4 pressure ulcer to sacrum. Pressure ulcer to right hip. Wound to top of right foot with purulent drainage. Deep tissue injury to left heel. Snowden catheter in place upon arrival. States was placed 2-3 weeks ago by home health nurse. Leg bag attached to catheter in ED. Pt incontinent of stool prior to arrival. Pt cleansed, les care provided. Clean gown placed on pt. Contact precautions initiated upon arrival due to hx infection and loose stool with recent use of abx. Monitor x3 placed. Call bell within reach.

## 2017-01-08 NOTE — ED PROVIDER NOTES
HPI Comments: 60year old woman with hx of HTN, DM, CVA, Right BKA, chronic wounds, presenting to the ED via EMS due to patient's niece calling with concern about patient. It is unclear what the primary concern from the family member was but per EMS the patient was found on the ground covered in stool. Patient was also noted to have a seizure like event in the truck for about 30 seconds. Patient denies knowing why she is here in the ED. She reports being on the ground for about an hour because she wanted to be there. She denies and chest pain, abdominal pain, shortness of breath, fever, chills, nausea, vomiting, diarrhea. Patient is a 60 y.o. female presenting with seizures. The history is provided by the patient and the EMS personnel. Seizure    Pertinent negatives include no cough, no nausea, no vomiting and no diarrhea. She reports no diarrhea, no vomiting, no cough. Past Medical History:   Diagnosis Date    Colonoscopy refused 11-16-15    DM gastroparesis ([facility]      gastric pacer    Encounter for long-term (current) use of insulin (HCC)     Gastrointestinal disorder      GERD    HTN (hypertension)     Noncompliance     Orthostatic hypotension     Quadriplegia ([address]     Stroke ([facility]     Type II or unspecified type diabetes mellitus with neurological manifestations, uncontrolled        Past Surgical History:   Procedure Laterality Date    Hx gi       gastroenteric pacemaker    Hx above knee amputation       May 2016         Family History:   Problem Relation Age of Onset    Heart Disease Father     Diabetes Other      multiple family members       Social History     Social History    Marital status: SINGLE     Spouse name: N/A    Number of children: N/A    Years of education: N/A     Occupational History    Not on file.      Social History Main Topics    Smoking status: Former Smoker     Quit date: 4/16/2009    Smokeless tobacco: Not on file    Alcohol use 1.0 oz/week     2 Cans of beer per week    Drug use: No    Sexual activity: Not on file     Other Topics Concern    Not on file     Social History Narrative         ALLERGIES: Review of patient's allergies indicates no known allergies. Review of Systems   Constitutional: Negative for chills and fever. Respiratory: Negative for cough and shortness of breath. Gastrointestinal: Negative for abdominal pain, constipation, diarrhea, nausea and vomiting. Genitourinary: Negative for dysuria. Musculoskeletal: Negative for back pain. Neurological: Positive for seizures. Negative for syncope and light-headedness. All other systems reviewed and are negative. There were no vitals filed for this visit. Physical Exam   Constitutional:  Non-toxic appearance. Chronically sick appearing   HENT:   Head: Normocephalic and atraumatic. Mouth/Throat: Mucous membranes are not dry. Eyes: EOM are normal. Pupils are equal, round, and reactive to light. No scleral icterus. Cardiovascular: Normal pulses. Tachycardia present. Pulmonary/Chest: Effort normal. She has no decreased breath sounds. She has no wheezes. She has no rhonchi. She has no rales. Abdominal: Soft. Bowel sounds are normal.   Musculoskeletal:   Right BKA   Neurological: She is alert. She has normal strength. GCS eye subscore is 4. GCS verbal subscore is 5. GCS motor subscore is 6. Oriented to person and month, names wrong hospital and wrong year   Skin: Skin is warm.         Stage 4 ulcer over sacrum, no drainage of pus or surrounding erythema    Stage 2-3 ulcer over right greater trochanter, no drainage of pus or surrounding erythema      Left lateral foot and ankle with chronic appearing cutaneous wound with no drainage of pus or surrounding erythema        MDM  Number of Diagnoses or Management Options  Urinary tract infection associated with catheterization of urinary tract, unspecified indwelling urinary catheter type, initial encounter:   Diagnosis management comments: 60year old woman with multiple co morbidities including DM, HTN, CVA, right BKA, multiple chronic wounds, wheelchair bound with indwelling villaseñor, presenting to the ED via EMS due to family concerned for possible seizure. Was found on the ground by EMS and covered in fecal matter. She is afebrile, tachycardic. She is alert but oriented only to person and current month. No focal neuro deficits. Multiple chronic appearing wounds without evidence of active infection. Has a villaseñor in place with murky urine. Ordered CBC, CMP, ECG, cardiac enzymes, UA, CXR, CT head. Will hydrate with IV fluids. DDx:  Fall  Intracranial bleed  Seizure  UTI  Sepsis  PNA  DKA  DONTE          Amount and/or Complexity of Data Reviewed  Clinical lab tests: ordered and reviewed  Tests in the radiology section of CPT®: ordered and reviewed  Tests in the medicine section of CPT®: ordered and reviewed      ED Course     1940: Labs and imaging resulted and reviewed. UA significant for UTI. CBC significant for leukocytosis. IV Ceftriaxone ordered and patient accepted by hospitalist for admission for complicated UTI. Procedures    EKG interpretation: (Preliminary)  Rhythm: sinus tachycardia; and regular . Rate (approx.): 102; Axis: normal; P wave: normal; QRS interval: normal ; ST/T wave: non-specific changes; Other findings: borderline ekg.

## 2017-01-09 NOTE — PROGRESS NOTES
* No surgery found *  Bedside shift change report given to [address] [place] [address] (oncoming nurse) by Sea Costello (offgoing nurse). Report included the following information SBAR, Kardex and ED Summary.     Zone Phone:   [phone]      Significant changes during shift:  Admission, spoke with Dr. Harinder Aggarwal who stated to insert rectal tube if loose stool persists due to multiple pressure sores and to obtain a CDIFF specimen, pt has not had another bm this shift        Patient Information    Cat Simeon  60 y.o.  1/8/2017  4:29 PM by Hansa Rosas MD. Cat Simeon was admitted from Home    Problem List    Patient Active Problem List    Diagnosis Date Noted    UTI (urinary tract infection) 01/08/2017    Sacral pain 09/21/2016    Delirium due to another medical condition 09/21/2016    Debility 09/21/2016    Unresponsive episode 09/21/2016    Altered mental status 09/20/2016    GI bleed 09/20/2016    Goals of care, counseling/discussion 07/22/2016    DNR (do not resuscitate) discussion 07/22/2016    Diabetes mellitus with foot ulcer and gangrene ([address] 07/21/2016    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 ([address] 07/21/2016    Quadriplegia ([address] 07/21/2016    Iron deficiency anemia 07/21/2016    Decubitus ulcer 07/21/2016    Chronic bilateral low back pain without sciatica 07/21/2016    Weakness 07/21/2016    Axonal GBS (Guillain-Tatamy syndrome) ([address] 07/21/2016    Sepsis ([address] 07/20/2016    GBS (Guillain Tatamy syndrome) ([address] 07/09/2016    Diabetic peripheral neuropathy associated with type 2 diabetes mellitus ([address] 07/07/2016    Idiopathic progressive polyneuropathy 07/07/2016    Quadriplegia and quadriparesis ([address] 07/07/2016    DKA (diabetic ketoacidoses) ([address] 07/01/2016    Gangrene of foot ([address] 04/24/2016    ARF (acute renal failure) ([address] 04/24/2016    Neurogenic bladder 04/24/2016    Pneumonia due to infectious organism 04/24/2016    SIRS (systemic inflammatory response syndrome) [facility] 04/24/2016    Hyponatremia 04/24/2016    Diabetic gastroparesis associated with type 2 diabetes mellitus [facility] 04/24/2016    Hyperglycemia due to type 2 diabetes mellitus ([facility] 04/23/2016    Encounter for long-term (current) use of insulin (HCC)     DM gastroparesis ([facility]     Diabetes ([facility]     Type II or unspecified type diabetes mellitus with neurological manifestations, uncontrolled     Orthostatic hypotension     Noncompliance     Gastroparesis 04/19/2014    Closed bimalleolar fracture of right ankle 04/16/2014    DM (diabetes mellitus) ([facility] 04/16/2014    HTN (hypertension) 04/16/2014     Past Medical History   Diagnosis Date    Colonoscopy refused 11-16-15    DM gastroparesis (HCC)      gastric pacer    Encounter for long-term (current) use of insulin (HCC)     Gastrointestinal disorder      GERD    HTN (hypertension)     Noncompliance     Orthostatic hypotension     Quadri[facility]gia [facility]     St[facility]     Type II 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 YES     PICC LINE? NO     Urinary Catheter? YES Placement Date Unknown pt admitted with catheter and refused for it to be changed Reason Medically Necessary Stage 4 pressure ulcers    DVT prophylaxis:    DVT prophylaxis Med- YES  DVT prophylaxis SCD or BERT- NO     Wounds: (If Applicable)    Wounds- YES    Location Sacrum, R hip, L foot, small opening on L knee    Patient Safety:    Falls Score Total Score: 5  Safety Level_______  Bed Alarm On? YES  Sitter? NO    Plan for upcoming shift: Neuro consult, wound care consult, IV antibiotics and fluids. Discharge Plan: none yet, snf?     Active Consults:  IP CONSULT TO NEUROLOGY

## 2017-01-09 NOTE — CONSULTS
NEUROLOGY CONSULTATION NOTE     DATE OF CONSULTATION: 1/9/2017    CONSULTED BY: Wolf Victor MD    Reason for Consult  I have been asked to see the patient in neurological consultation to render advice and opinion regarding seizures. HISTORY OF PRESENT ILLNESS  Nohelia Real is a 60 y.o. female who presents to the hospital on 1/8/2017. Pt cannot provide any meaningful history. History is obtained by chart review. EMS was called by family members because the patient was found on the floor covered in stool. He was brought to the hospital but did have a seizure like event that lasted for 30 seconds in the truck. He did have a CT head in ER and that was normal. Blood work showed low Hb, UTI and leukocytosis. Patients baseline is not known. Pt has been taking keppra 500 mg po bid at home.      ROS  A ten system review of constitutional, cardiovascular, respiratory, musculoskeletal, endocrine, skin, SHEENT, genitourinary, psychiatric and neurologic systems was obtained and is unremarkable except as stated in HPI     PMH  Past Medical History   Diagnosis Date    Colonoscopy refused 11-16-15    DM gastroparesis ([address]      gastric pacer    Encounter for long-term (current) use of insulin ([address]     Gastrointestinal disorder      GERD    HTN (hypertension)     Noncompliance     Orthostatic hypotension     Quadriplegia ([address]     Stroke ([address]     Type II or unspecified type diabetes mellitus with neurological manifestations, uncontrolled        FH  Family History   Problem Relation Age of Onset    Heart Disease Father     Diabetes Other      multiple family members       SH  Social History     Social History    Marital status: SINGLE     Spouse name: N/A    Number of children: N/A    Years of education: N/A     Social History Main Topics    Smoking status: Former Smoker     Quit date: 4/16/2009    Smokeless tobacco: None    Alcohol use 1.0 oz/week     2 Cans of beer per week    Drug use: No    Sexual activity: Not Asked     Other Topics Concern    None     Social History Narrative       ALLERGIES  No Known Allergies    PHYSICAL EXAM  EXAMINATION:   Visit Vitals    /85 (BP 1 Location: Right arm, BP Patient Position: At rest)    Pulse 93    Temp 98.8 °F (37.1 °C)    Resp 18    SpO2 100%        General:   General appearance: Pt is in no acute distress   Distal pulses are preserved  Fundoscopic exam: attempted    Neurological Examination:   Mental Status: AAO x2 (pt is able to tell her name and that she is in the hospital). According to the nurse, she has been having hallucinations. Speech is fluent. Follows some commands, but not consistently, has poor fund of knowledge, attention, short term recall, comprehension and insight. Cranial Nerves: Visual fields- could not assess,  PERRL, Extraocular movements are full. Facial sensation could not be assessed. Facial movement intact, symmetric. Hearing intact to conversation. Palate elevates symmetrically. Shoulder shrug symmetric. Tongue midline. Motor: Strength - moves the upper ext symm and it is 4/5, right lower ext is amputated and LLE is 2/5. Sensation: could not be assessed    Reflexes: DTRs 1+ throughout. Coordination/Cerebellar: unable to assess    Gait: deferred    Skin: No significant bruising or lacerations. HOME MEDS  Prior to Admission Medications   Prescriptions Last Dose Informant Patient Reported? Taking? LORazepam (ATIVAN) 0.5 mg tablet   Yes No   Sig: Take 0.5 mg by mouth every eight (8) hours as needed for Anxiety. ONDANSETRON (ZOFRAN ODT PO)   Yes No   Sig: Take 8 mg by mouth. aluminum & magnesium hydroxide-simethicone (MAALOX MAXIMUM STRENGTH) 400-400-40 mg/5 mL suspension   Yes No   Sig: Take 10 mL by mouth every six (6) hours as needed for Indigestion. collagenase (SANTYL) 250 unit/gram ointment   Yes No   Sig: Apply  to affected area three (3) times daily.  Apply to sacral wound every shift   ferrous sulfate 325 mg (65 mg iron) tablet   Yes No   Sig: Take 325 mg by mouth three (3) times daily (with meals). glucose 4 gram chewable tablet   No No   Sig: Take 4 Tabs by mouth as needed. insulin lispro (HUMALOG) 100 unit/mL injection   No No   Sig: Ac meals:  200-250=2 units, 251-300=4units,613=581=5 units, 351-400=8units, greater than 401=10 units   levETIRAcetam (KEPPRA) 500 mg tablet   No No   Sig: Take 1 Tab by mouth two (2) times a day. magnesium oxide (MAG-OX) 400 mg tablet   No No   Sig: Take 1 Tab by mouth two (2) times a day. metFORMIN (GLUCOPHAGE) 500 mg tablet   Yes No   Sig: Take  by mouth two (2) times daily (with meals). omeprazole (PRILOSEC) 20 mg capsule   Yes No   Sig: Take 20 mg by mouth daily. ondansetron (ZOFRAN ODT) 8 mg disintegrating tablet   No No   Sig: Take 1 Tab by mouth every eight (8) hours as needed for Nausea. oxyCODONE-acetaminophen (PERCOCET) 5-325 mg per tablet   No No   Sig: Take 1 Tab by mouth every six (6) hours as needed for Pain. Max Daily Amount: 4 Tabs. sertraline (ZOLOFT) 50 mg tablet   Yes No   Sig: Take  by mouth daily. vancomycin (VANCOCIN) 125 mg capsule   Yes No   Sig: Take  by mouth four (4) times daily.       Facility-Administered Medications: None       CURRENT MEDS  Current Facility-Administered Medications   Medication Dose Route Frequency    collagenase (SANTYL) 250 unit/gram ointment   Topical TID    ferrous sulfate tablet 325 mg  325 mg Oral TID WITH MEALS    levETIRAcetam (KEPPRA) tablet 500 mg  500 mg Oral BID    magnesium oxide (MAG-OX) tablet 400 mg  400 mg Oral BID    pantoprazole (PROTONIX) tablet 40 mg  40 mg Oral ACB    sertraline (ZOLOFT) tablet 50 mg  50 mg Oral DAILY    insulin lispro (HUMALOG) injection   SubCUTAneous AC&HS    cefTRIAXone (ROCEPHIN) 1 g in 0.9% sodium chloride (MBP/ADV) 50 mL  1 g IntraVENous Q24H    sodium chloride (NS) flush 5-10 mL  5-10 mL IntraVENous Q8H    0.9% sodium chloride infusion  100 mL/hr IntraVENous CONTINUOUS    heparin (porcine) injection 5,000 Units  5,000 Units SubCUTAneous Q8H       LAB DATA REVIEWED:    Results for orders placed or performed during the hospital encounter of 01/08/17   CULTURE, BLOOD   Result Value Ref Range    Special Requests: NO SPECIAL REQUESTS      Culture result: NO GROWTH AFTER 7 HOURS     CBC WITH AUTOMATED DIFF   Result Value Ref Range    WBC 12.0 (H) 3.6 - 11.0 K/uL    RBC 3.11 (L) 3.80 - 5.20 M/uL    HGB 7.2 (L) 11.5 - 16.0 g/dL    HCT 24.3 (L) 35.0 - 47.0 %    MCV 78.1 (L) 80.0 - 99.0 FL    MCH 23.2 (L) 26.0 - 34.0 PG    MCHC 29.6 (L) 30.0 - 36.5 g/dL    RDW 15.1 (H) 11.5 - 14.5 %    PLATELET 133 (H) 214 - 400 K/uL    NEUTROPHILS 85 (H) 32 - 75 %    LYMPHOCYTES 11 (L) 12 - 49 %    MONOCYTES 4 (L) 5 - 13 %    EOSINOPHILS 0 0 - 7 %    BASOPHILS 0 0 - 1 %    ABS. NEUTROPHILS 10.2 (H) 1.8 - 8.0 K/UL    ABS. LYMPHOCYTES 1.3 0.8 - 3.5 K/UL    ABS. MONOCYTES 0.5 0.0 - 1.0 K/UL    ABS. EOSINOPHILS 0.0 0.0 - 0.4 K/UL    ABS. BASOPHILS 0.0 0.0 - 0.1 K/UL    DF SMEAR SCANNED      RBC COMMENTS HYPOCHROMIA  1+        RBC COMMENTS MICROCYTOSIS  1+       METABOLIC PANEL, COMPREHENSIVE   Result Value Ref Range    Sodium 142 136 - 145 mmol/L    Potassium 3.4 (L) 3.5 - 5.1 mmol/L    Chloride 105 97 - 108 mmol/L    CO2 26 21 - 32 mmol/L    Anion gap 11 5 - 15 mmol/L    Glucose 362 (H) 65 - 100 mg/dL    BUN 17 6 - 20 MG/DL    Creatinine 0.83 0.55 - 1.02 MG/DL    BUN/Creatinine ratio 20 12 - 20      GFR est AA >60 >60 ml/min/1.73m2    GFR est non-AA >60 >60 ml/min/1.73m2    Calcium 7.7 (L) 8.5 - 10.1 MG/DL    Bilirubin, total 0.2 0.2 - 1.0 MG/DL    ALT 13 12 - 78 U/L    AST 27 15 - 37 U/L    Alk.  phosphatase 387 (H) 45 - 117 U/L    Protein, total 6.2 (L) 6.4 - 8.2 g/dL    Albumin 1.0 (L) 3.5 - 5.0 g/dL    Globulin 5.2 (H) 2.0 - 4.0 g/dL    A-G Ratio 0.2 (L) 1.1 - 2.2     MAGNESIUM   Result Value Ref Range    Magnesium 1.7 1.6 - 2.4 mg/dL   TROPONIN I   Result Value Ref Range Troponin-I, Qt. <0.04 <0.05 ng/mL   CK W/ REFLX CKMB   Result Value Ref Range     26 - 192 U/L   URINALYSIS W/ REFLEX CULTURE   Result Value Ref Range    Color YELLOW/STRAW      Appearance OPAQUE (A) CLEAR      Specific gravity 1.025 1.003 - 1.030      pH (UA) 6.0 5.0 - 8.0      Protein >300 (A) NEG mg/dL    Glucose 250 (A) NEG mg/dL    Ketone TRACE (A) NEG mg/dL    Bilirubin NEGATIVE  NEG      Blood LARGE (A) NEG      Urobilinogen 0.2 0.2 - 1.0 EU/dL    Nitrites NEGATIVE  NEG      Leukocyte Esterase LARGE (A) NEG      WBC >100 (H) 0 - 4 /hpf    RBC  0 - 5 /hpf    Epithelial cells FEW FEW /lpf    Bacteria 1+ (A) NEG /hpf    UA:UC IF INDICATED URINE CULTURE ORDERED (A) CNI     LACTIC ACID, PLASMA   Result Value Ref Range    Lactic acid 1.6 0.4 - 2.0 MMOL/L   LACTIC ACID, PLASMA   Result Value Ref Range    Lactic acid 1.7 0.4 - 2.0 MMOL/L   HEMOGLOBIN A1C WITH EAG   Result Value Ref Range    Hemoglobin A1c 6.6 (H) 4.2 - 6.3 %    Est. average glucose 143 mg/dL   TSH 3RD GENERATION   Result Value Ref Range    TSH 1.25 0.36 - 3.74 uIU/mL   LIPID PANEL   Result Value Ref Range    LIPID PROFILE          Cholesterol, total 136 <200 MG/DL    Triglyceride 156 (H) <150 MG/DL    HDL Cholesterol 35 MG/DL    LDL, calculated 69.8 0 - 100 MG/DL    VLDL, calculated 31.2 MG/DL    CHOL/HDL Ratio 3.9 0 - 5.0     IRON PROFILE   Result Value Ref Range    Iron 22 (L) 35 - 150 ug/dL    TIBC 68 (L) 250 - 450 ug/dL    Iron % saturation 32 20 - 50 %   AMMONIA   Result Value Ref Range    Ammonia 35 (H) <32 UMOL/L   DRUG SCREEN, URINE   Result Value Ref Range    AMPHETAMINE NEGATIVE  NEG      BARBITURATES NEGATIVE  NEG      BENZODIAZEPINE NEGATIVE  NEG      COCAINE NEGATIVE  NEG      METHADONE NEGATIVE  NEG      OPIATES NEGATIVE  NEG      PCP(PHENCYCLIDINE) NEGATIVE  NEG      THC (TH-CANNABINOL) NEGATIVE  NEG      Drug screen comment (NOTE)    FERRITIN   Result Value Ref Range    Ferritin 1064 (H) 8 - 252 NG/ML   CBC WITH AUTOMATED DIFF   Result Value Ref Range    WBC 12.5 (H) 3.6 - 11.0 K/uL    RBC 3.42 (L) 3.80 - 5.20 M/uL    HGB 8.0 (L) 11.5 - 16.0 g/dL    HCT 26.8 (L) 35.0 - 47.0 %    MCV 78.4 (L) 80.0 - 99.0 FL    MCH 23.4 (L) 26.0 - 34.0 PG    MCHC 29.9 (L) 30.0 - 36.5 g/dL    RDW 15.1 (H) 11.5 - 14.5 %    PLATELET 740 (H) 002 - 400 K/uL    NEUTROPHILS 83 (H) 32 - 75 %    LYMPHOCYTES 14 12 - 49 %    MONOCYTES 3 (L) 5 - 13 %    EOSINOPHILS 0 0 - 7 %    BASOPHILS 0 0 - 1 %    ABS. NEUTROPHILS 10.3 (H) 1.8 - 8.0 K/UL    ABS. LYMPHOCYTES 1.8 0.8 - 3.5 K/UL    ABS. MONOCYTES 0.4 0.0 - 1.0 K/UL    ABS. EOSINOPHILS 0.1 0.0 - 0.4 K/UL    ABS. BASOPHILS 0.0 0.0 - 0.1 K/UL   METABOLIC PANEL, COMPREHENSIVE   Result Value Ref Range    Sodium 141 136 - 145 mmol/L    Potassium 3.2 (L) 3.5 - 5.1 mmol/L    Chloride 106 97 - 108 mmol/L    CO2 25 21 - 32 mmol/L    Anion gap 10 5 - 15 mmol/L    Glucose 330 (H) 65 - 100 mg/dL    BUN 18 6 - 20 MG/DL    Creatinine 0.77 0.55 - 1.02 MG/DL    BUN/Creatinine ratio 23 (H) 12 - 20      GFR est AA >60 >60 ml/min/1.73m2    GFR est non-AA >60 >60 ml/min/1.73m2    Calcium 7.9 (L) 8.5 - 10.1 MG/DL    Bilirubin, total 0.1 (L) 0.2 - 1.0 MG/DL    ALT 12 12 - 78 U/L    AST 18 15 - 37 U/L    Alk.  phosphatase 399 (H) 45 - 117 U/L    Protein, total 7.0 6.4 - 8.2 g/dL    Albumin 1.1 (L) 3.5 - 5.0 g/dL    Globulin 5.9 (H) 2.0 - 4.0 g/dL    A-G Ratio 0.2 (L) 1.1 - 2.2     GLUCOSE, POC   Result Value Ref Range    Glucose (POC) 306 (H) 65 - 100 mg/dL    Performed by Floyd Lacey POC   Result Value Ref Range    Glucose (POC) 106 (H) 65 - 100 mg/dL    Performed by Sd Rocha    EKG, 12 LEAD, INITIAL   Result Value Ref Range    Ventricular Rate 102 BPM    Atrial Rate 102 BPM    P-R Interval 136 ms    QRS Duration 64 ms    Q-T Interval 354 ms    QTC Calculation (Bezet) 461 ms    Calculated P Axis 52 degrees    Calculated R Axis 15 degrees    Calculated T Axis 69 degrees    Diagnosis       Sinus tachycardia  Septal infarct , age undetermined  Abnormal ECG  When compared with ECG of 01-JUL-2016 16:14,  Questionable change in QRS duration  Septal infarct is now present     TYPE & SCREEN   Result Value Ref Range    Crossmatch Expiration 01/11/2017     ABO/Rh(D) O POSITIVE     Antibody screen NEG       EKG  Sinus tachycardia     Imaging review:  CT Head  No evidence of acute intracranial process. Small air-fluid level in  the right maxillary sinus. Recommend correlation for sinusitis.     IMPRESSION:  Patient Active Problem List   Diagnosis Code    Closed bimalleolar fracture of right ankle S82.841A    DM (diabetes mellitus) ([facility] E11.9    HTN (hypertension) I10    Gastroparesis K31.84    DM gastroparesis ([facility]) E11.43, K31.84    Diabetes ([facility] E11.9    Type II or unspecified type diabetes mellitus with neurological manifestations, uncontrolled E11.49    Orthostatic hypotension I95.1    Noncompliance Z91.19    Encounter for long-term (current) use of insulin ([facility]) Z79.4    Hyperglycemia due to type 2 diabetes mellitus ([facility] E11.65    Gangrene of foot ([facility] I96    ARF (acute renal failure) ([facility]) N17.9    Neurogenic bladder N31.9    Pneumonia due to infectious organism J18.9    SIRS (systemic inflammatory response syndrome) ([facility]) R65.10    Hyponatremia E87.1    Diabetic gastroparesis associated with type 2 diabetes mellitus ([facility]) E11.43, K31.84    DKA (diabetic ketoacidoses) ([facility]) E13.10    Diabetic peripheral neuropathy associated with type 2 diabetes mellitus ([facility] E11.42    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1    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 female who presents with altered mental status and possible seizure activity. She has been taking keppra 500 mg po bid as per records. It is a low dose and I do plan to increase it to 1000 mg po bid. I will also get MRI of the brain and an eeg. She does also have UTI and that could be the contributing factor for her encephalopathy. Treatment as per primary physician. RECOMMENDATIONS:  - Increase keppra to 1000 mg po bid  - EEG  - MRI brain without contrast  - Seizure precautions. Thank you very much for this consultation. We will follow up on the above studies and give further recommendations as indicated.       Sally Walton MD

## 2017-01-09 NOTE — PROGRESS NOTES
Patient with glucose of 79. Patient refuses to drink juice or take anything from the nurse. Patient refuses re-check of glucose.

## 2017-01-09 NOTE — ED NOTES
TRANSFER - OUT REPORT:    Verbal report given to Eva(name) on Marlene Spatz  being transferred to Neuro Tele(unit) for routine progression of care       Report consisted of patients Situation, Background, Assessment and   Recommendations(SBAR). Information from the following report(s) SBAR was reviewed with the receiving nurse. Lines:   [employer] 09/25/16 (Active)        Opportunity for questions and clarification was provided.       Patient transported with:   [employer]

## 2017-01-09 NOTE — WOUND CARE
Pressure Ulcer Prevention In basket Alert Received for Jose Alberto < 14 (moderate risk).      Suggested Care Plan/Interventions for Nursing  1. Complete Jose Alberto Pressure Ulcer Risk Scale and use sub scores to identify appropriate interventions. 2. Perform Assessment: skin, changes in LOC, visual cues for pain, monitor skin under medical devices  3. Respond to Reduced Sensory Perception: changes in LOC, check visual cues for pain, float heels, suspension boots, pressure redistribution bed/mattress/chair cushion, turning and reposition approximately every 2 hours (pillows & wedges), pad between skin to skin, turn & reposition  4. Manage Moisture: absorbent under pads, internal / external urinary device, internal /  external fecal device, minimize layers, contain wound drainage, access need for specialty bed, limit adult briefs, maintain skin hydration (lotion/cream), moisture barrier, offer toileting every hour  5. Promote Activity: increase time out of bed, chair cushion, PT/OT evaluation, trapeze to reposition, pressure redistribution bed/mattress/chair  6. Address Reduced Mobility: float heels / suspension boot, HOB 30 degrees or less, pressure redistribution bed/mattress/cushion, PT / OT evaluation, turn and reposition approximately every 2 hours (pillows & wedges)  7. Promote Nutrition: document food / fluid / supplement intake, encourage/assist with meals as needed  8. Reduce Friction and Shear: transferring/repositioning devices (lift/draw sheet), lift team/ patient mobility team, feet elevated on foot rest, minimize layers, foam dressing / transparent film / skin sealants, protective barrier creams and emollients, transfer aides (board, Lamberto lift, ceiling lift, stand assist), HOB 30 degrees or less, trapeze to reposition.   Wound Care Team

## 2017-01-09 NOTE — PROGRESS NOTES
PT note:     Orders received and acknowledged. Chart reviewed and spoke with nursing. Patient currently not appropriate for PT services as she is confused and yelling, believing her bed is on fire. Will follow up for PT evaluation once patient is redirectable.      Larrie Harada, PT, DPT

## 2017-01-09 NOTE — H&P
Hospitalist Admission Note    NAME: Michelle Stone   :  1960   MRN:  [id number]     Date/Time:  2017 8:54 PM    Patient PCP: Natividad Barber MD  ________________________________________________________________________    My assessment of this patient's clinical condition and my plan of care is as follows. Assessment / Plan:  # Altered mental status (AMS). Admit to telemetry . neurovascular checks,   seizure and fall precautions. Ordered labs including ammonia, , and TSH. keppra levels  Ct  La/blood cx/ua cx/ua tox  Neuro consult         # Anemia. Check labs  Type/screen  contw fe sulfate     # UTI. Continue IV antibiotics. Already started on Zosyn per the ED. Check urine culture and may adjust antibiotics accordingly. #  Leukocytosis. Plan as above. Repeat CBC in a.m. # Type 2 DM with hyperglycemia. ssi  Hold metfornin   scheduled blood glucose checks and check hemoglobin A1c level. # diarrhea- check c dif/stool cx  isolation      #  Multiple ulcers /Stage 4 sacral wound. consult with wound care team in a.m. Placed order to turn patient q 2 hours. Ordered skin care precautions and checks. Get pt to see pt-      # Seizure. On home meds for the same. Check levels    Code Status: Full  IVF ns  EL: replace prn  DVT Prophylaxis: heparin            Subjective:   CHIEF COMPLAINT: ms changes/confusion    HISTORY OF PRESENT ILLNESS:     Martha Almanza is a  60 y.o. female with past medical history of type 2 diabetes mellitus (DM), diabetic gastroparesis, GERD, hypertension, noncompliance, orthostatic hypotension, stroke, quadriplegia rt bka  presented to the ED via EMS from from   home  Patient is a poor historian,  confused . As such, majority of history was obtained per review of ED/ medical records. Per these collective reports, per- niece\" calling with concern about patient.  It is unclear what the primary concern from the family member was but per EMS the patient was found on the ground covered in stool. Patient was also noted to have a seizure like event in the truck for about 30 seconds. Patient denies knowing why she is here in the ED. She reports being on the ground for about an hour because she wanted to be there. She denies and chest pain, abdominal pain, shortness of breath, fever, chills, nausea, vomiting, diarrhea. Pt has  A villaseñor cath in place               We were asked to admit for work up and evaluation of the above problems. Past Medical History   Diagnosis Date    Colonoscopy refused 11-16-15    DM gastroparesis ([address]      gastric pacer    Encounter for long-term (current) use of insulin (HCC)     Gastrointestinal disorder      GERD    HTN (hypertension)     Noncompliance     Orthostatic hypotension     [facility]     Stroke [facility]     Type II or unspecified type diabetes mellitus with neurological manifestations, uncontrolled         Past Surgical History   Procedure Laterality Date    Hx gi       gastroenteric pacemaker    Hx above knee amputation       May 2016       Social History   Substance Use Topics    Smoking status: Former Smoker     Quit date: 4/16/2009    Smokeless tobacco: Not on file    Alcohol use 1.0 oz/week     2 Cans of beer per week        Family History   Problem Relation Age of Onset    Heart Disease Father     Diabetes Other      multiple family members     No Known Allergies     Prior to Admission medications    Medication Sig Start Date End Date Taking? Authorizing Provider   LORazepam (ATIVAN) 0.5 mg tablet Take 0.5 mg by mouth every eight (8) hours as needed for Anxiety. Madonna Isaacs MD   omeprazole (PRILOSEC) 20 mg capsule Take 20 mg by mouth daily. Madonna Isaacs MD   aluminum & magnesium hydroxide-simethicone (MAALOX MAXIMUM STRENGTH) 400-400-40 mg/5 mL suspension Take 10 mL by mouth every six (6) hours as needed for Indigestion. Madonna Isaacs MD   sertraline (ZOLOFT) 50 mg tablet Take  by mouth daily.     Phys MD Ferny   vancomycin (VANCOCIN) 125 mg capsule Take  by mouth four (4) times daily. Madonna Isaacs MD   metFORMIN (GLUCOPHAGE) 500 mg tablet Take  by mouth two (2) times daily (with meals). Madonna Isaacs MD   ONDANSETRON (ZOFRAN ODT PO) Take 8 mg by mouth. Madonna Isaacs MD   ondansetron (ZOFRAN ODT) 8 mg disintegrating tablet Take 1 Tab by mouth every eight (8) hours as needed for Nausea. 12/17/16   Wild Sanabria MD   glucose 4 gram chewable tablet Take 4 Tabs by mouth as needed. 10/4/16   Garo Duggan MD   insulin lispro (HUMALOG) 100 unit/mL injection Ac meals:  200-250=2 units, 251-300=4units,193=965=5 units, 351-400=8units, greater than 401=10 units 10/4/16   Garo Duggan MD   levETIRAcetam (KEPPRA) 500 mg tablet Take 1 Tab by mouth two (2) times a day. 10/4/16   Garo Duggan MD   magnesium oxide (MAG-OX) 400 mg tablet Take 1 Tab by mouth two (2) times a day. 10/4/16   Garo Duggan MD   oxyCODONE-acetaminophen (PERCOCET) 5-325 mg per tablet Take 1 Tab by mouth every six (6) hours as needed for Pain. Max Daily Amount: 4 Tabs. 10/4/16   Garo Duggan MD   ferrous sulfate 325 mg (65 mg iron) tablet Take 325 mg by mouth three (3) times daily (with meals). Historical Provider   collagenase (SANTYL) 250 unit/gram ointment Apply  to affected area three (3) times daily. Apply to sacral wound every shift    Historical Provider       REVIEW OF SYSTEMS:     I am not able to complete the review of systems because:    The patient is intubated and sedated    The patient has altered mental status due to his acute medical problems    The patient has baseline aphasia from prior stroke(s)   x The patient has baseline dementia and is not reliable historian    The patient is in acute medical distress and unable to provide information             Objective:   VITALS:    Visit Vitals    BP (!) 136/98 (BP 1 Location: Left arm)    Pulse (!) 105    Temp 98.3 °F (36.8 °C)    Resp 18  SpO2 97%       PHYSICAL EXAM:  General:  Ill appearing female in no acute respiratory distress   Head:  Normocephalic, without obvious abnormality, atraumatic   Eyes:  Conjunctivae/corneas clear. PERRL, EOMs intact   E/N/M/T: Nares keren  Clear oropharynx   Neck: Normal appearance and movements, symmetrical, trachea midline  No meningismus signs  Normal JVD   Lungs:  Symmetrical chest expansion and respiratory effort  Clear to auscultation bilaterally   Chest wall:     Heart:  Regular rate and rhythm   Sounds normal; no murmur, click, rub or gallop   Abdomen:  Soft, no tenderness  No rebound, guarding, or rigidity  Non-distended  Bowel sounds normal  No masses or hepatosplenomegaly  No hernias present   Back: No CVA tenderness   Extremities: Extremities normal, atraumatic  No cyanosis or edema       Skin: Stage 4 sacral/ buttock decubitus ulcer mild erythema  Necrotic eschar on dorsum and heel of left foot/ 8cm ulcer noted lft foot   Rt kba  warm/ dry       Psych:  poor insight. .  Not anxious or agitated. Neurologic: EOMs intact. No facial asymmetry. No aphasia or slurred speech. Symmetrical strength, able to  Move left leg Alert and oriented X 4.     _______________________________________________________________________  Care Plan discussed with:    Comments   Patient x    Family      RN     Care Manager                    Consultant:      _______________________________________________________________________  Expected  Disposition:   Home with Family    HH/PT/OT/RN    SNF/LTC x   [facility]    ________________________________________________________________________  TOTAL TIME:  80 Minutes    Critical Care Provided     Minutes non procedure based      Comments    xx Reviewed previous records   >50% of visit spent in counseling and coordination of care xx Discussion with patient and/or family and questions answered       ________________________________________________________________________  Signed: Dwight Mcdaniels. Anurag Adams MD    Procedures: see electronic medical records for all procedures/Xrays and details which were not copied into this note but were reviewed prior to creation of Plan. LAB DATA REVIEWED:    Recent Results (from the past 24 hour(s))   EKG, 12 LEAD, INITIAL    Collection Time: 01/08/17  5:24 PM   Result Value Ref Range    Ventricular Rate 102 BPM    Atrial Rate 102 BPM    P-R Interval 136 ms    QRS Duration 64 ms    Q-T Interval 354 ms    QTC Calculation (Bezet) 461 ms    Calculated P Axis 52 degrees    Calculated R Axis 15 degrees    Calculated T Axis 69 degrees    Diagnosis       Sinus tachycardia  Septal infarct , age undetermined  Abnormal ECG  When compared with ECG of 01-JUL-2016 16:14,  Questionable change in QRS duration  Septal infarct is now present     CBC WITH AUTOMATED DIFF    Collection Time: 01/08/17  5:33 PM   Result Value Ref Range    WBC 12.0 (H) 3.6 - 11.0 K/uL    RBC 3.11 (L) 3.80 - 5.20 M/uL    HGB 7.2 (L) 11.5 - 16.0 g/dL    HCT 24.3 (L) 35.0 - 47.0 %    MCV 78.1 (L) 80.0 - 99.0 FL    MCH 23.2 (L) 26.0 - 34.0 PG    MCHC 29.6 (L) 30.0 - 36.5 g/dL    RDW 15.1 (H) 11.5 - 14.5 %    PLATELET 043 (H) 173 - 400 K/uL    NEUTROPHILS 85 (H) 32 - 75 %    LYMPHOCYTES 11 (L) 12 - 49 %    MONOCYTES 4 (L) 5 - 13 %    EOSINOPHILS 0 0 - 7 %    BASOPHILS 0 0 - 1 %    ABS. NEUTROPHILS 10.2 (H) 1.8 - 8.0 K/UL    ABS. LYMPHOCYTES 1.3 0.8 - 3.5 K/UL    ABS. MONOCYTES 0.5 0.0 - 1.0 K/UL    ABS. EOSINOPHILS 0.0 0.0 - 0.4 K/UL    ABS.  BASOPHILS 0.0 0.0 - 0.1 K/UL    DF SMEAR SCANNED      RBC COMMENTS HYPOCHROMIA  1+        RBC COMMENTS MICROCYTOSIS  1+       METABOLIC PANEL, COMPREHENSIVE    Collection Time: 01/08/17  5:33 PM   Result Value Ref Range    Sodium 142 136 - 145 mmol/L    Potassium 3.4 (L) 3.5 - 5.1 mmol/L    Chloride 105 97 - 108 mmol/L    CO2 26 21 - 32 mmol/L    Anion gap 11 5 - 15 mmol/L    Glucose 362 (H) 65 - 100 mg/dL    BUN 17 6 - 20 MG/DL    Creatinine 0.83 0.55 - 1.02 MG/DL BUN/Creatinine ratio 20 12 - 20      GFR est AA >60 >60 ml/min/1.73m2    GFR est non-AA >60 >60 ml/min/1.73m2    Calcium 7.7 (L) 8.5 - 10.1 MG/DL    Bilirubin, total 0.2 0.2 - 1.0 MG/DL    ALT 13 12 - 78 U/L    AST 27 15 - 37 U/L    Alk.  phosphatase 387 (H) 45 - 117 U/L    Protein, total 6.2 (L) 6.4 - 8.2 g/dL    Albumin 1.0 (L) 3.5 - 5.0 g/dL    Globulin 5.2 (H) 2.0 - 4.0 g/dL    A-G Ratio 0.2 (L) 1.1 - 2.2     MAGNESIUM    Collection Time: 01/08/17  5:33 PM   Result Value Ref Range    Magnesium 1.7 1.6 - 2.4 mg/dL   TROPONIN I    Collection Time: 01/08/17  5:33 PM   Result Value Ref Range    Troponin-I, Qt. <0.04 <0.05 ng/mL   CK W/ REFLX CKMB    Collection Time: 01/08/17  5:33 PM   Result Value Ref Range     26 - 192 U/L   URINALYSIS W/ REFLEX CULTURE    Collection Time: 01/08/17  5:35 PM   Result Value Ref Range    Color YELLOW/STRAW      Appearance OPAQUE (A) CLEAR      Specific gravity 1.025 1.003 - 1.030      pH (UA) 6.0 5.0 - 8.0      Protein >300 (A) NEG mg/dL    Glucose 250 (A) NEG mg/dL    Ketone TRACE (A) NEG mg/dL    Bilirubin NEGATIVE  NEG      Blood LARGE (A) NEG      Urobilinogen 0.2 0.2 - 1.0 EU/dL    Nitrites NEGATIVE  NEG      Leukocyte Esterase LARGE (A) NEG      WBC >100 (H) 0 - 4 /hpf    RBC  0 - 5 /hpf    Epithelial cells FEW FEW /lpf    Bacteria 1+ (A) NEG /hpf    UA:UC IF INDICATED URINE CULTURE ORDERED (A) CNI         H/o depression/anxiety- cont w home meds

## 2017-01-09 NOTE — WOUND CARE
Wound Care Consult: Chart reviewed and patient assessed for her wounds that were present on admission. Patient lives with a relative. She is disoriented but alert and friendly, able to converse (although very unreliable). Wound on the left heel and the sacrum are consistent with pressure ulcers but the right hip (very clean) and the left top of the foot are full thickness wounds. The left dorsal foot needs Rene debridement but the left HEEL needs daily application of Betadine. Wound care orders written for all of this wound care. See below for detailed assessment. Specialty bed ordered for this patient. WOUND POA CONDITIONS    Wound Foot Dorsal;Left (Active)   DRESSING STATUS Removed 1/9/2017 11:41 AM   DRESSING TYPE Foam 1/9/2017 11:41 AM   Non-Pressure Ulcer Full thickness (subcut/muscle) 1/9/2017 11:41 AM   Wound Length (cm) 12 cm 1/9/2017 11:41 AM   Wound Width (cm) 4.5 cm 1/9/2017 11:41 AM   Wound Depth (cm) 0.4 1/9/2017 11:41 AM   Wound Surface area (cm^3) 21.6 cm^2 1/9/2017 11:41 AM   Condition of Base Eschar;Pink;Slough; Tendon exposed 1/9/2017 11:41 AM   Condition of Edges Rolled/curled 1/9/2017 11:41 AM   Tissue Type Other (comment); Black; Pink 1/9/2017 11:41 AM   Drainage Amount  Small  1/9/2017 11:41 AM   Drainage Color Green 1/9/2017 11:41 AM   Wound Odor None 1/9/2017 11:41 AM   Periwound Skin Condition Erythema, blanchable; Other (comment) 1/9/2017 11:41 AM   Cleansing and Cleansing Agents  Normal saline 1/9/2017 11:41 AM   Dressing Type Applied Foam;Wet to dry 1/9/2017 11:41 AM   Procedure Tolerated Well 1/9/2017 11:41 AM   Number of days:0       Wound Hip Right; Anterior (Active)   DRESSING STATUS Removed 1/9/2017 11:41 AM   DRESSING TYPE Foam 1/9/2017 11:41 AM   Non-Pressure Ulcer Full thickness (subcut/muscle) 1/9/2017 11:41 AM   Pressure Ulcer Stage lll 1/9/2017 11:41 AM   Wound Length (cm) 2.2 cm 1/9/2017 11:41 AM   Wound Width (cm) 2.4 cm 1/9/2017 11:41 AM   Wound Depth (cm) 0.4 1/9/2017 11:41 AM   Wound Surface area (cm^3) 2.11 cm^2 1/9/2017 11:41 AM   Condition of Base [place] 1/9/2017 11:41 AM   Condition of Edges Rolled/curled 1/9/2017 11:41 AM   Epithelialization (%) 0 1/9/2017 11:41 AM   Tissue Type Pink 1/9/2017 11:41 AM   Tissue Type Percent Pink 100 1/9/2017 11:41 AM   Drainage Amount  Small  1/9/2017 11:41 AM   Drainage Color Serosanguinous 1/9/2017 11:41 AM   Wound Odor None 1/9/2017 11:41 AM   Periwound Skin Condition Intact 1/9/2017 11:41 AM   Cleansing and Cleansing Agents  Normal saline 1/9/2017 11:41 AM   Dressing Type Applied Foam;Wet to dry 1/9/2017 11:41 AM   Procedure Tolerated Well 1/9/2017 11:41 AM   Number of days:0       Wound Heel Left (Active)   DRESSING TYPE Open to air 1/9/2017 11:41 AM   Pressure Ulcer Unstageable 1/9/2017 11:41 AM   Condition of Base Eschar 1/9/2017 11:41 AM   Condition of Edges Rolled/curled 1/9/2017 11:41 AM   Epithelialization (%) 0 1/9/2017 11:41 AM   Tissue Type Black 1/9/2017 11:41 AM   Tissue Type Percent Black 100 % 1/9/2017 11:41 AM   Drainage Amount  None 1/9/2017 11:41 AM   Wound Odor None 1/9/2017 11:41 AM   Periwound Skin Condition Intact 1/9/2017 11:41 AM   Cleansing and Cleansing Agents  Betadine 1/9/2017 11:41 AM   Dressing Type Applied Open to air 1/9/2017 11:41 AM   Procedure Tolerated Well 1/9/2017 11:41 AM   Number of days:0       Wound Sacral/coccyx Mid (Active)   DRESSING STATUS Removed 1/9/2017 11:41 AM   DRESSING TYPE Foam 1/9/2017 11:41 AM   Pressure Ulcer Stage lV 1/9/2017 11:41 AM   Wound Length (cm) 6 cm 1/9/2017 11:41 AM   Wound Width (cm) 4 cm 1/9/2017 11:41 AM   Wound Depth (cm) 1.2 1/9/2017 11:41 AM   Wound Surface area (cm^3) 28.8 cm^2 1/9/2017 11:41 AM   Condition of Base Muscle exposed;Pink;Undermined 1/9/2017 11:41 AM   Condition of Edges Rolled/curled 1/9/2017 11:41 AM   Epithelialization (%) 0 1/9/2017 11:41 AM   Tissue Type Pink 1/9/2017 11:41 AM   Tissue Type Percent Pink 100 1/9/2017 11:41 AM   Direction of Undermining 4    oclock;5    oclock;6    oclock;7    oclock 1/9/2017 11:41 AM   Depth of Undermining (cm) 5 1/9/2017 11:41 AM   Drainage Amount  Moderate 1/9/2017 11:41 AM   Drainage Color Brown;Yellow 1/9/2017 11:41 AM   Wound Odor None 1/9/2017 11:41 AM   Periwound Skin Condition Erythema, blanchable; Intact; Other (comment) 1/9/2017 11:41 AM   Cleansing and Cleansing Agents  Normal saline 1/9/2017 11:41 AM   Dressing Type Applied Wet to dry;Packing; Foam 1/9/2017 11:41 AM   Procedure Tolerated Well 1/9/2017 11:41 AM          Jaswant Santos, RN, BSN,CWON

## 2017-01-09 NOTE — PROGRESS NOTES
Hospitalist Progress Note    NAME: Ander Flores   :  1960   MRN:  [id number]     Interim Hospital Summary: 60 y.o. female whom presented on 2017 with      Assessment / Plan:    Acute Encephalopathy  Seizures?  -CT head nothing acute. Ammonia 35. TSH 1.25  -keppra increased  -MRI pending  -EEG pending  -Neurology input appreciated. Discussed with Dr Steve Alston    UTI  -continue Rocephin Day 2. Follow up on cultures (prelim GNR)    Anemia  -iron indices consistent with chronic disease (low iron, low TIBC, high ferritin)    DM (HgA1c 6.6)  -SSI prn      Pressure ulcers / full thickness wounds POA  -involves left heel, dorsum left foot, sacral area and right hip. Appreciate wound care input and recommendations. Please refer to their initial assessment note   for details. S/P Right BKA    Diarrhea?  -C diff negative    Code status: Full  Prophylaxis: Lovenox or heparin SQ  Recommended Disposition: TBD       Subjective:     Chief Complaint / Reason for Physician Visit  Follow up of encephalopathy, anemia, UTI, diarrhea  Chart reviewed in detail. Discussed with RN events overnight. Review of Systems:  Symptom Y/N Comments  Symptom Y/N Comments   Fever/Chills    Chest Pain n    Poor Appetite    Edema     Cough    Abdominal Pain     Sputum    Joint Pain     SOB/BONNER n   Pruritis/Rash     Nausea/vomit n   Tolerating PT/OT     Diarrhea    Tolerating Diet     Constipation    Other       Could NOT obtain due to:      PO intake: No data found. Objective:     VITALS:   Last 24hrs VS reviewed since prior progress note.  Most recent are:  Patient Vitals for the past 24 hrs:   Temp Pulse Resp BP SpO2   17 1512 - 94 18 160/87 100 %   17 1155 98.6 °F (37 °C) 86 20 158/89 100 %   17 0732 98.8 °F (37.1 °C) 93 18 151/85 100 %   17 0257 98.5 °F (36.9 °C) 94 18 152/82 100 %   17 2322 98.5 °F (36.9 °C) 98 18 133/69 100 %   17 2144 - (!) 101 16 167/86 98 %       Intake/Output Summary (Last 24 hours) at 01/09/17 1645  Last data filed at 01/09/17 0520   Gross per 24 hour   Intake                0 ml   Output              200 ml   Net             -200 ml        PHYSICAL EXAM:  General: WD, WN. Alert, somewhat cooperative, chronically ill appearing  EENT:  EOMI. Anicteric sclerae. MMM  Resp:  CTA bilaterally, no wheezing or rales. No accessory muscle use  CV:  Regular  rhythm,  No edema  GI:  Soft, Non distended, Non tender.  +Bowel sounds  Neurologic:  Alert and oriented X 3, normal speech,   Psych:   Poor insight. Not anxious nor agitated  Skin:  (+) left heel and sacral pressure ulcers with wounds over top of left foot and right hip area. See wound care note    Reviewed most current lab test results and cultures  YES  Reviewed most current radiology test results   YES  Review and summation of old records today    NO  Reviewed patient's current orders and MAR    YES  PMH/SH reviewed - no change compared to H&P  ________________________________________________________________________  Care Plan discussed with:    Comments   Patient x    Family      RN x    Care Manager     Consultant  x Wound care                      Multidiciplinary team rounds were held today with , nursing, pharmacist and clinical coordinator. Patient's plan of care was discussed; medications were reviewed and discharge planning was addressed.      ________________________________________________________________________  Total NON critical care TIME:  30   Minutes    Total CRITICAL CARE TIME Spent:   Minutes non procedure based      Comments   >50% of visit spent in counseling and coordination of care x     This includes time during multidisciplinary rounds if indicated above   ________________________________________________________________________  Jonny Spring MD     Procedures: see electronic medical records for all procedures/Xrays and details which were not copied into this note but were reviewed prior to creation of Plan. LABS:  I reviewed today's most current labs and imaging studies.   Pertinent labs include:  Recent Labs      01/09/17 0059 01/08/17   1733   WBC  12.5*  12.0*   HGB  8.0*  7.2*   HCT  26.8*  24.3*   PLT  558*  523*     Recent Labs      01/09/17 0059 01/08/17   1733   NA  141  142   K  3.2*  3.4*   CL  106  105   CO2  25  26   GLU  330*  362*   BUN  18  17   CREA  0.77  0.83   CA  7.9*  7.7*   MG   --   1.7   ALB  1.1*  1.0*   TBILI  0.1*  0.2   SGOT  18  27   ALT  12  13

## 2017-01-09 NOTE — ROUTINE PROCESS

## 2017-01-09 NOTE — PROGRESS NOTES
Problem: Patient Education: Go to Patient Education Activity  Goal: Patient/Family Education  Outcome: Not Progressing Towards Goal  Patient has multiple PTA wounds and does not appear to understand education.

## 2017-01-09 NOTE — DIABETES MGMT
[facility]:    Consult received. Attempted to meet with pt to complete assessment of home management, however pt not appropriate for education at this time. Will f/u at later time if pt becomes more appropriate. If appropriate, please consider adding ac&hs POC glucose checks.       Thank you,    Dayo Eastman, [address]  Office: [phone]

## 2017-01-10 NOTE — PROGRESS NOTES
Bedside and Verbal shift change report given to Isela Mehta RN (oncoming nurse) by Parviz Bo RN (offgoing nurse). Report included the following information SBAR, Kardex, Intake/Output, MAR and Recent Results.     Zone Phone:   [phone]      Significant changes during shift:  none        Patient Information    Cat Simeon  60 y.o.  1/8/2017  4:29 PM by Hansa Rosas MD. Cat Simeon was admitted from Home    Problem List    Patient Active Problem List    Diagnosis Date Noted    UTI (urinary tract infection) 01/08/2017    Sacral pain 09/21/2016    Delirium due to another medical condition 09/21/2016    Debility 09/21/2016    Unresponsive episode 09/21/2016    Altered mental status 09/20/2016    GI bleed 09/20/2016    Goals of care, counseling/discussion 07/22/2016    DNR (do not resuscitate) discussion 07/22/2016    Diabetes mellitus with foot ulcer and gangrene ([address] 07/21/2016    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 ([address] 07/21/2016    Quadriplegia ([address] 07/21/2016    Iron deficiency anemia 07/21/2016    Decubitus ulcer 07/21/2016    Chronic bilateral low back pain without sciatica 07/21/2016    Weakness 07/21/2016    Axonal GBS (Guillain-Morris syndrome) ([address] 07/21/2016    Sepsis ([address] 07/20/2016    GBS (Guillain Morris syndrome) ([address] 07/09/2016    Diabetic peripheral neuropathy associated with type 2 diabetes mellitus ([address] 07/07/2016    Idiopathic progressive polyneuropathy 07/07/2016    Quadriplegia and quadriparesis ([address] 07/07/2016    DKA (diabetic ketoacidoses) ([address] 07/01/2016    Gangrene of foot ([address] 04/24/2016    ARF (acute renal failure) ([address] 04/24/2016    Neurogenic bladder 04/24/2016    Pneumonia due to infectious organism 04/24/2016    SIRS (systemic inflammatory response syndrome) ([address] 04/24/2016    Hyponatremia 04/24/2016    Diabetic gastroparesis associated with type 2 diabetes mellitus ([facility] [facility] 04/24/2016    Hyperglycemia due to type 2 diabetes mellitus ([facility] [facility] 04/23/2016    Encounter for long-term (current) use of insulin ([facility] [facility]     DM gastroparesis ([facility] [address] [facility]     Diabetes ([facility] [address]     Type II or unspecified type diabetes mellitus with neurological manifestations, uncontrolled     Orthostatic hypotension     Noncompliance     Gastroparesis 04/19/2014    Closed bimalleolar fracture of right ankle 04/16/2014    DM (diabetes mellitus) ([facility] [facility] 04/16/2014    HTN (hypertension) 04/16/2014     Past Medical History   Diagnosis Date    Colonoscopy refused 11-16-15    DM gastroparesis ([facility] [address] [facility]      gastric pacer    Encounter for long-term (current) use of insulin (HCC)     Gastrointestinal disorder      GERD    HTN (hypertension)     Noncompliance     Orthostatic hypotension     Quadriplegia ([facility] [facility]     Stroke ([facility] [address] [facility]     Type II or unspecified type diabetes mellitus with neurological manifestations, uncontrolled          Core Measures:    CVA: NO NO  CHF:NO NO  PNA:NO NO    Post Op Surgical (If Applicable):     Number times ambulated in hallway past shift:  0  Number of times OOB to chair past shift:   0  NG Tube: NO  Incentive Spirometer: NO  Drains: NO   Volume  0  Dressing Present:  NO  Flatus:  YES    Activity Status:    OOB to Chair NO  Ambulated this shift NO   Bed Rest YES    Supplemental O2: (If Applicable)    NC NO  NRB NO  Venti-mask NO  On 0 Liters/min      LINES AND DRAINS:    Urinary Catheter? YES Placement Date PTA (patient refused to have changed in ED) Reason Medically Necessary stage 4 pressure ulcer    DVT prophylaxis:    DVT prophylaxis Med- YES  DVT prophylaxis SCD or BERT- NO     Wounds: (If Applicable)    Wounds- YES    Location see flowsheet    Patient Safety:    Falls Score Total Score: 5  Safety Level___III____  Bed Alarm On? YES  Sitter?  NO    Plan for upcoming shift: monitor for safety, monitor blood sugars        Discharge Plan: YES ongoing    Active Consults:  IP CONSULT TO NEUROLOGY

## 2017-01-10 NOTE — PROGRESS NOTES
Physical Therapy:    Attempted to see patient for PT session, however patient too disoriented and unable to follow commands to participate in therapy at this time. Will hold at this time and continue to follow as able. Thank you.    Autumn Tolliver, PT, DPT

## 2017-01-10 NOTE — PROGRESS NOTES
Hospitalist Progress Note    NAME: Andrés Waldrop   :  1960   MRN:  [id number]     Interim Hospital Summary: 60 y.o. female whom presented on 2017 with      Assessment / Plan:    Acute Encephalopathy, post ictal vs baseline  Seizures?  -CT head nothing acute. Ammonia 35. TSH 1.25  -continue increased dose of keppra  -MRI pending  -EEG showed encephalopathy and no ongoing seizures  -Neurology input appreciated. Discussed with Dr Darryle Hides  -family is pursuing LTC placement for this patient. They do not want to take her back home because they don't feel they can deal with her anymore. Patient is mean and verbally abusive to family. Patient was just discharged from [facility] court rehab last month. Patient's sister (caregiver) just recently had a stroke and a  shut placed and is in no position to help anymore. Discussed with CM. Needs UAI. UTI  -continue Rocephin Day 3/3. Follow up on cultures (prelim GNR)    Anemia  -iron indices consistent with chronic disease (low iron, low TIBC, high ferritin)  -transfuse 1 unit for Hg 6.8 and low retic count. DM (HgA1c 6.6)  -SSI prn    Pressure ulcers / full thickness wounds POA  -involves left heel, dorsum left foot, sacral area and right hip. Appreciate wound care input and recommendations. Please refer to their initial assessment note   for details. S/P Right BKA    Diarrhea  -C diff negative. No diarrhea noted    Code status: Full  Prophylaxis: Lovenox or heparin SQ  Recommended Disposition: TBD       Subjective:     Chief Complaint / Reason for Physician Visit  Follow up of encephalopathy, anemia, UTI, diarrhea  Chart reviewed in detail. Discussed with RN events overnight. Remains confused.       Review of Systems:  Symptom Y/N Comments  Symptom Y/N Comments   Fever/Chills    Chest Pain n    Poor Appetite    Edema     Cough    Abdominal Pain     Sputum    Joint Pain     SOB/BONNER n Pruritis/Rash     Nausea/vomit n   Tolerating PT/OT     Diarrhea    Tolerating Diet     Constipation    Other       Could NOT obtain due to:      PO intake: No data found. Objective:     VITALS:   Last 24hrs VS reviewed since prior progress note. Most recent are:  Patient Vitals for the past 24 hrs:   Temp Pulse Resp BP SpO2   01/10/17 1159 98 °F (36.7 °C) 98 20 169/82 97 %   01/10/17 0929 - - - 159/83 -   01/10/17 0814 97.9 °F (36.6 °C) 97 18 180/88 100 %   01/10/17 0422 98.2 °F (36.8 °C) 95 18 (!) 169/99 100 %   01/09/17 2346 98.3 °F (36.8 °C) 96 18 (!) 163/91 100 %   01/09/17 1926 97.7 °F (36.5 °C) 94 18 164/82 100 %   01/09/17 1512 98.2 °F (36.8 °C) 94 18 160/87 100 %       Intake/Output Summary (Last 24 hours) at 01/10/17 1228  Last data filed at 01/09/17 1926   Gross per 24 hour   Intake                0 ml   Output              600 ml   Net             -600 ml        PHYSICAL EXAM:  General: WD, WN. Alert, somewhat cooperative, chronically ill appearing  EENT:  EOMI. Anicteric sclerae. MMM  Resp:  CTA bilaterally, no wheezing or rales. No accessory muscle use  CV:  Regular  rhythm,  No edema  GI:  Soft, Non distended, Non tender.  +Bowel sounds  Neurologic:  Alert and not oriented to situation, place or year, normal speech,   Psych:   Poor insight. Not anxious nor agitated  Skin:  (+) left heel and sacral pressure ulcers with wounds over top of left foot and right hip area.   See wound care note    Reviewed most current lab test results and cultures  YES  Reviewed most current radiology test results   YES  Review and summation of old records today    NO  Reviewed patient's current orders and MAR    YES  PMH/SH reviewed - no change compared to H&P  ________________________________________________________________________  Care Plan discussed with:    Comments   Patient x    Family  x Niece by phone   RN x    Care Manager x DC planning   Consultant                       x Multidiciplinary team rounds were held today with , nursing, pharmacist and clinical coordinator. Patient's plan of care was discussed; medications were reviewed and discharge planning was addressed. ________________________________________________________________________  Total NON critical care TIME:  35   Minutes    Total CRITICAL CARE TIME Spent:   Minutes non procedure based      Comments   >50% of visit spent in counseling and coordination of care x DC planning discussions. Obtaining more history from family. This includes time during multidisciplinary rounds if indicated above   ________________________________________________________________________  Mercedez Dewitt MD     Procedures: see electronic medical records for all procedures/Xrays and details which were not copied into this note but were reviewed prior to creation of Plan. LABS:  I reviewed today's most current labs and imaging studies.   Pertinent labs include:  Recent Labs      01/10/17   0545  01/09/17 0059 01/08/17   1733   WBC  12.0*  12.5*  12.0*   HGB  6.8*  8.0*  7.2*   HCT  22.6*  26.8*  24.3*   PLT  516*  558*  523*     Recent Labs      01/10/17   0545  01/09/17   0059  01/08/17   1733   NA  140  141  142   K  3.2*  3.2*  3.4*   CL  109*  106  105   CO2  20*  25  26   GLU  69  330*  362*   BUN  11  18  17   CREA  0.47*  0.77  0.83   CA  7.3*  7.9*  7.7*   MG   --    --   1.7   ALB   --   1.1*  1.0*   TBILI   --   0.1*  0.2   SGOT   --   18  27   ALT   --   12  13

## 2017-01-10 NOTE — PROGRESS NOTES
Verbal report given to oncoming nurse MARVIN Ibarra  Report consisted of patients Situation, Background, Assessment, and   Recommendations    Information was reviewed with the receiving nurse. Opportunity for questions and clarification was provided.

## 2017-01-10 NOTE — PROGRESS NOTES
Pt refuses to have villaseñor changed, came in from home with it. UA showed UTI on admission, being treated with antibiotics.

## 2017-01-10 NOTE — PROGRESS NOTES
Initial Nutrition Assessment:    INTERVENTIONS/RECOMMENDATIONS:   · Meals/Snacks: General/healthful diet: Consistent carbohydrate diet   · Supplements: Commercial supplement: Glucerna shakes BID, gelatein protein supplement daily     ASSESSMENT:   Patient medically noted for UTI, AMS, and anemia. PMH for DM, HTN, gastroparesis, CVA, and right BKA. Patient reports she is eating okay. Lunch arrived during my visit but she had not started her meal. Hallucinating and speaking to her niece who was not present in the room. Skin breakdown noted. History of weight loss noted per chart review. Will provide glucerna shakes BID and gelatein protein supplement daily. Monitor appetite/PO and acceptance of supplements. Diet Order: Consistent carb  % Eaten:  No data found. Pertinent Medications: [x]Reviewed []Other: ferrous sulfate, humalog, keppra, magox, protonix, zoloft   Pertinent Labs: [x]Reviewed []Other: K+ 3.2, BG 02-78-72-00-98-24  Food Allergies: [x]None []Other   Last BM: 1/9   [x]Active     []Hyperactive  []Hypoactive       [] Absent BS  Skin:    [] Intact   [] Incision  [x] Breakdown (stage 4 sacrum, unstageable left heel) [] Other:    Anthropometrics:   Height: 5' 5\" (165.1 cm) Weight:  130# (last wt per chart from 12/16/16)    IBW (%IBW): 56.7 kg (125 lb) ( ) UBW (%UBW):   (  %)   Last Weight Metrics:  Weight Loss Metrics 12/16/2016 10/4/2016 7/26/2016 7/8/2016 5/3/2016 4/23/2016 11/17/2015   Today's Wt 130 lb 141 lb 169 lb 1.5 oz 148 lb 9.4 oz 143 lb 143 lb 141 lb 8 oz   BMI 21.63 kg/m2 23.46 kg/m2 26.48 kg/m2 23.99 kg/m2 23.09 kg/m2 22.39 kg/m2 22.85 kg/m2       BMI: 21.6    This BMI is indicative of:   []Underweight    [x]Normal    []Overweight    [] Obesity   [] Extreme Obesity (BMI>40)     Estimated Nutrition Needs (Based on):   6827 Kcals/day (BMR (1171) x 1.3AF +250kcal for wound healing) , 89 g (-94g (1.5-1.6 g/kg bw)) Protein  Carbohydrate:  At Least 130 g/day  Fluids: 1800 mL/day (1ml/kcal)    Pt expected to meet estimated nutrient needs: [x]Yes []No    NUTRITION DIAGNOSES:   Problem:  Increased protein needs      Etiology: related to wound healing     Signs/Symptoms: as evidenced by unstageable left heel, stage 4 sacrum       NUTRITION INTERVENTIONS:  Meals/Snacks: General/healthful diet   Supplements: Commercial supplement              GOAL:   PO intake >50% of meals/supplements next 4-6 days     LEARNING NEEDS (Diet, Food/Nutrient-Drug Interaction):    [x] None Identified   [] Identified and Education Provided/Documented   [] Identified and Pt declined/was not appropriate     Cultureal, Mosque, OR Ethnic Dietary Needs:    [x] None Identified   [] Identified and Addressed     [x] Interdisciplinary Care Plan Reviewed/Documented    [x] Discharge Planning: Diabetic diet. Protein supplement for wound healing        MONITORING /EVALUATION:   Behavioral-Environmental Outcomes: Behavior  Food/Nutrient Intake Outcomes:  Total energy intake, Protein intake  Physical Signs/Symptoms Outcomes: Weight/weight change, Electrolyte and renal profile, Glucose profile    NUTRITION RISK:    [] High              [x] Moderate           []  Low  []  Minimal/Uncompromised    PT SEEN FOR:    []  MD Consult: []Calorie Count      []Diabetic Diet Education        []Diet Education     []Electrolyte Management     []General Nutrition Management and Supplements     []Management of Tube Feeding     []TPN Recommendations    [x]  RN Referral:  []MST score >=2     []Enteral/Parenteral Nutrition PTA     []Pregnant: Gestational DM or Multigestation     [x]Pressure Ulcer/Wound Care needs        []  Low BMI  []  DTR Referral       Bridgette Guzman  Pager [phone]                Weekend Pager [phone]

## 2017-01-10 NOTE — PROCEDURES
Patient Name: Ariana Huddleston  : 1960  Age: 60 y.o. Ordering physician: No ref. provider found  Date of EE/10/2017  EEG procedure number: [id number]  Diagnosis: encephalopathy   Interpreting physician: Nic Oliver MD    ELECTROENCEPHALOGRAM REPORT     PROCEDURE: EEG. CLINICAL INDICATION: The patient is a 60 y.o. female with a history of possible seizures. EEG to rule out seizures, rule out stroke, rule out cortical abnormality. EEG CLASSIFICATION: Abnormal     DESCRIPTION OF THE RECORD:   The background of this recording contains generalized theta range activity with no clear posterior dominant rhythm. Throughout the recording, there were no clear areas of focal slowing nor spike or spike-and-wave discharges seen. Hyperventilation was not performed. Photic stimulation produced no response. During the recording the patient did not achieve stage II sleep    INTERPRETATION:   Abnormal. Moderate diffuse cerebral dysfunction which is non specific for etiology but can be seen in toxic/metabolic states. No seizures. Clinical correlation recommended.       Nic Oliver MD  1/10/2017  10:33 AM

## 2017-01-10 NOTE — PROGRESS NOTES
NEUROLOGY progress note     SUBJECTIVE   - Pt seems to be calmer than yesterday  - Continues to have hallucinations. - EEG showed encephalopathy and no ongoing seizures  - MRI brain pending    To recap,  Pt cannot provide any meaningful history. History is obtained by chart review. EMS was called by family members because the patient was found on the floor covered in stool. He was brought to the hospital but did have a seizure like event that lasted for 30 seconds in the truck. He did have a CT head in ER and that was normal. Blood work showed low Hb, UTI and leukocytosis. Patients baseline is not known. Pt has been taking keppra 500 mg po bid at home. ROS  A ten system review of constitutional, cardiovascular, respiratory, musculoskeletal, endocrine, skin, SHEENT, genitourinary, psychiatric and neurologic systems was obtained and is unremarkable except as stated in HPI     PHYSICAL EXAM  EXAMINATION:   Visit Vitals    /83    Pulse 97    Temp 97.9 °F (36.6 °C)    Resp 18    SpO2 100%        General:   General appearance: Pt is in no acute distress   Distal pulses are preserved  Fundoscopic exam: attempted    Neurological Examination:   Mental Status: AAO x2 (pt is able to tell her name and that she is in the hospital). Speech is fluent. Follows some commands, but not consistently, has poor fund of knowledge, attention, short term recall, comprehension and insight. Cranial Nerves: Visual fields- could not assess,  PERRL, Extraocular movements are full. Facial sensation could not be assessed. Facial movement intact, symmetric. Hearing intact to conversation. Palate elevates symmetrically. Shoulder shrug symmetric. Tongue midline. Motor: Strength - moves the upper ext symm and it is 4/5, right lower ext is amputated and LLE is 2/5.     Sensation: responds to painful stimuli in the UE    Coordination/Cerebellar: unable to assess    Gait: deferred    Skin: No significant bruising or lacerations. CURRENT MEDS  Current Facility-Administered Medications   Medication Dose Route Frequency    levETIRAcetam (KEPPRA) tablet 1,000 mg  1,000 mg Oral BID    collagenase (SANTYL) 250 unit/gram ointment   Topical DAILY    ferrous sulfate tablet 325 mg  325 mg Oral TID WITH MEALS    magnesium oxide (MAG-OX) tablet 400 mg  400 mg Oral BID    pantoprazole (PROTONIX) tablet 40 mg  40 mg Oral ACB    sertraline (ZOLOFT) tablet 50 mg  50 mg Oral DAILY    insulin lispro (HUMALOG) injection   SubCUTAneous AC&HS    cefTRIAXone (ROCEPHIN) 1 g in 0.9% sodium chloride (MBP/ADV) 50 mL  1 g IntraVENous Q24H    sodium chloride (NS) flush 5-10 mL  5-10 mL IntraVENous Q8H    0.9% sodium chloride infusion  100 mL/hr IntraVENous CONTINUOUS    heparin (porcine) injection 5,000 Units  5,000 Units SubCUTAneous Q8H       LAB DATA REVIEWED:    Results for orders placed or performed during the hospital encounter of 01/08/17   C. DIFFICILE (DNA)   Result Value Ref Range    C. difficile (DNA) NEGATIVE  NEG     CULTURE, URINE   Result Value Ref Range    Special Requests: NO SPECIAL REQUESTS      [place] Count >100,000  COLONIES/mL        Culture result: GRAM NEGATIVE RODS     CULTURE, BLOOD   Result Value Ref Range    Special Requests: NO SPECIAL REQUESTS      Culture result: NO GROWTH 2 DAYS     CBC WITH AUTOMATED DIFF   Result Value Ref Range    WBC 12.0 (H) 3.6 - 11.0 K/uL    RBC 3.11 (L) 3.80 - 5.20 M/uL    HGB 7.2 (L) 11.5 - 16.0 g/dL    HCT 24.3 (L) 35.0 - 47.0 %    MCV 78.1 (L) 80.0 - 99.0 FL    MCH 23.2 (L) 26.0 - 34.0 PG    MCHC 29.6 (L) 30.0 - 36.5 g/dL    RDW 15.1 (H) 11.5 - 14.5 %    PLATELET 097 (H) 329 - 400 K/uL    NEUTROPHILS 85 (H) 32 - 75 %    LYMPHOCYTES 11 (L) 12 - 49 %    MONOCYTES 4 (L) 5 - 13 %    EOSINOPHILS 0 0 - 7 %    BASOPHILS 0 0 - 1 %    ABS. NEUTROPHILS 10.2 (H) 1.8 - 8.0 K/UL    ABS. LYMPHOCYTES 1.3 0.8 - 3.5 K/UL    ABS. MONOCYTES 0.5 0.0 - 1.0 K/UL    ABS.  EOSINOPHILS 0.0 0.0 - 0.4 K/UL ABS. BASOPHILS 0.0 0.0 - 0.1 K/UL    DF SMEAR SCANNED      RBC COMMENTS HYPOCHROMIA  1+        RBC COMMENTS MICROCYTOSIS  1+       METABOLIC PANEL, COMPREHENSIVE   Result Value Ref Range    Sodium 142 136 - 145 mmol/L    Potassium 3.4 (L) 3.5 - 5.1 mmol/L    Chloride 105 97 - 108 mmol/L    CO2 26 21 - 32 mmol/L    Anion gap 11 5 - 15 mmol/L    Glucose 362 (H) 65 - 100 mg/dL    BUN 17 6 - 20 MG/DL    Creatinine 0.83 0.55 - 1.02 MG/DL    BUN/Creatinine ratio 20 12 - 20      GFR est AA >60 >60 ml/min/1.73m2    GFR est non-AA >60 >60 ml/min/1.73m2    Calcium 7.7 (L) 8.5 - 10.1 MG/DL    Bilirubin, total 0.2 0.2 - 1.0 MG/DL    ALT 13 12 - 78 U/L    AST 27 15 - 37 U/L    Alk.  phosphatase 387 (H) 45 - 117 U/L    Protein, total 6.2 (L) 6.4 - 8.2 g/dL    Albumin 1.0 (L) 3.5 - 5.0 g/dL    Globulin 5.2 (H) 2.0 - 4.0 g/dL    A-G Ratio 0.2 (L) 1.1 - 2.2     MAGNESIUM   Result Value Ref Range    Magnesium 1.7 1.6 - 2.4 mg/dL   TROPONIN I   Result Value Ref Range    Troponin-I, Qt. <0.04 <0.05 ng/mL   CK W/ REFLX CKMB   Result Value Ref Range     26 - 192 U/L   URINALYSIS W/ REFLEX CULTURE   Result Value Ref Range    Color YELLOW/STRAW      Appearance OPAQUE (A) CLEAR      Specific gravity 1.025 1.003 - 1.030      pH (UA) 6.0 5.0 - 8.0      Protein >300 (A) NEG mg/dL    Glucose 250 (A) NEG mg/dL    Ketone TRACE (A) NEG mg/dL    Bilirubin NEGATIVE  NEG      Blood LARGE (A) NEG      Urobilinogen 0.2 0.2 - 1.0 EU/dL    Nitrites NEGATIVE  NEG      Leukocyte Esterase LARGE (A) NEG      WBC >100 (H) 0 - 4 /hpf    RBC  0 - 5 /hpf    Epithelial cells FEW FEW /lpf    Bacteria 1+ (A) NEG /hpf    UA:UC IF INDICATED URINE CULTURE ORDERED (A) CNI     LACTIC ACID, PLASMA   Result Value Ref Range    Lactic acid 1.6 0.4 - 2.0 MMOL/L   LACTIC ACID, PLASMA   Result Value Ref Range    Lactic acid 1.7 0.4 - 2.0 MMOL/L   HEMOGLOBIN A1C WITH EAG   Result Value Ref Range    Hemoglobin A1c 6.6 (H) 4.2 - 6.3 %    Est. average glucose 143 mg/dL TSH 3RD GENERATION   Result Value Ref Range    TSH 1.25 0.36 - 3.74 uIU/mL   LIPID PANEL   Result Value Ref Range    LIPID PROFILE          Cholesterol, total 136 <200 MG/DL    Triglyceride 156 (H) <150 MG/DL    HDL Cholesterol 35 MG/DL    LDL, calculated 69.8 0 - 100 MG/DL    VLDL, calculated 31.2 MG/DL    CHOL/HDL Ratio 3.9 0 - 5.0     IRON PROFILE   Result Value Ref Range    Iron 22 (L) 35 - 150 ug/dL    TIBC 68 (L) 250 - 450 ug/dL    Iron % saturation 32 20 - 50 %   LEVETIRACETAM (KEPPRA)   Result Value Ref Range    Levetiracetam (Keppra) None detected 10.0 - 40.0 ug/mL   AMMONIA   Result Value Ref Range    Ammonia 35 (H) <32 UMOL/L   DRUG SCREEN, URINE   Result Value Ref Range    AMPHETAMINE NEGATIVE  NEG      BARBITURATES NEGATIVE  NEG      BENZODIAZEPINE NEGATIVE  NEG      COCAINE NEGATIVE  NEG      METHADONE NEGATIVE  NEG      OPIATES NEGATIVE  NEG      PCP(PHENCYCLIDINE) NEGATIVE  NEG      THC (TH-CANNABINOL) NEGATIVE  NEG      Drug screen comment (NOTE)    FERRITIN   Result Value Ref Range    Ferritin 1064 (H) 8 - 252 NG/ML   CBC WITH AUTOMATED DIFF   Result Value Ref Range    WBC 12.5 (H) 3.6 - 11.0 K/uL    RBC 3.42 (L) 3.80 - 5.20 M/uL    HGB 8.0 (L) 11.5 - 16.0 g/dL    HCT 26.8 (L) 35.0 - 47.0 %    MCV 78.4 (L) 80.0 - 99.0 FL    MCH 23.4 (L) 26.0 - 34.0 PG    MCHC 29.9 (L) 30.0 - 36.5 g/dL    RDW 15.1 (H) 11.5 - 14.5 %    PLATELET 867 (H) 292 - 400 K/uL    NEUTROPHILS 83 (H) 32 - 75 %    LYMPHOCYTES 14 12 - 49 %    MONOCYTES 3 (L) 5 - 13 %    EOSINOPHILS 0 0 - 7 %    BASOPHILS 0 0 - 1 %    ABS. NEUTROPHILS 10.3 (H) 1.8 - 8.0 K/UL    ABS. LYMPHOCYTES 1.8 0.8 - 3.5 K/UL    ABS. MONOCYTES 0.4 0.0 - 1.0 K/UL    ABS. EOSINOPHILS 0.1 0.0 - 0.4 K/UL    ABS.  BASOPHILS 0.0 0.0 - 0.1 K/UL   METABOLIC PANEL, COMPREHENSIVE   Result Value Ref Range    Sodium 141 136 - 145 mmol/L    Potassium 3.2 (L) 3.5 - 5.1 mmol/L    Chloride 106 97 - 108 mmol/L    CO2 25 21 - 32 mmol/L    Anion gap 10 5 - 15 mmol/L    Glucose 330 (H) 65 - 100 mg/dL    BUN 18 6 - 20 MG/DL    Creatinine 0.77 0.55 - 1.02 MG/DL    BUN/Creatinine ratio 23 (H) 12 - 20      GFR est AA >60 >60 ml/min/1.73m2    GFR est non-AA >60 >60 ml/min/1.73m2    Calcium 7.9 (L) 8.5 - 10.1 MG/DL    Bilirubin, total 0.1 (L) 0.2 - 1.0 MG/DL    ALT 12 12 - 78 U/L    AST 18 15 - 37 U/L    Alk. phosphatase 399 (H) 45 - 117 U/L    Protein, total 7.0 6.4 - 8.2 g/dL    Albumin 1.1 (L) 3.5 - 5.0 g/dL    Globulin 5.9 (H) 2.0 - 4.0 g/dL    A-G Ratio 0.2 (L) 1.1 - 2.2     CBC WITH AUTOMATED DIFF   Result Value Ref Range    WBC 12.0 (H) 3.6 - 11.0 K/uL    RBC 2.88 (L) 3.80 - 5.20 M/uL    HGB 6.8 (L) 11.5 - 16.0 g/dL    HCT 22.6 (L) 35.0 - 47.0 %    MCV 78.5 (L) 80.0 - 99.0 FL    MCH 23.6 (L) 26.0 - 34.0 PG    MCHC 30.1 30.0 - 36.5 g/dL    RDW 15.2 (H) 11.5 - 14.5 %    PLATELET 975 (H) 129 - 400 K/uL    NEUTROPHILS 81 (H) 32 - 75 %    LYMPHOCYTES 15 12 - 49 %    MONOCYTES 4 (L) 5 - 13 %    EOSINOPHILS 0 0 - 7 %    BASOPHILS 0 0 - 1 %    ABS. NEUTROPHILS 9.7 (H) 1.8 - 8.0 K/UL    ABS. LYMPHOCYTES 1.8 0.8 - 3.5 K/UL    ABS. MONOCYTES 0.5 0.0 - 1.0 K/UL    ABS. EOSINOPHILS 0.0 0.0 - 0.4 K/UL    ABS.  BASOPHILS 0.0 0.0 - 0.1 K/UL    RBC COMMENTS ANISOCYTOSIS  1+        RBC COMMENTS HYPOCHROMIA  1+        RBC COMMENTS MICROCYTOSIS  PRESENT        DF SMEAR SCANNED     RETICULOCYTE COUNT   Result Value Ref Range    Reticulocyte count 1.5 0.7 - 2.1 %   METABOLIC PANEL, BASIC   Result Value Ref Range    Sodium 140 136 - 145 mmol/L    Potassium 3.2 (L) 3.5 - 5.1 mmol/L    Chloride 109 (H) 97 - 108 mmol/L    CO2 20 (L) 21 - 32 mmol/L    Anion gap 11 5 - 15 mmol/L    Glucose 69 65 - 100 mg/dL    BUN 11 6 - 20 MG/DL    Creatinine 0.47 (L) 0.55 - 1.02 MG/DL    BUN/Creatinine ratio 23 (H) 12 - 20      GFR est AA >60 >60 ml/min/1.73m2    GFR est non-AA >60 >60 ml/min/1.73m2    Calcium 7.3 (L) 8.5 - 10.1 MG/DL   NUCLEATED RBC   Result Value Ref Range    NRBC 0.1 (H) 0  WBC    ABSOLUTE NRBC 0.02 (H) 0.00 - 0.01 K/uL    WBC CORRECTED FOR NR ADJUSTED FOR NUCLEATED RBC'S     GLUCOSE, POC   Result Value Ref Range    Glucose (POC) 306 (H) 65 - 100 mg/dL    Performed by [place], POC   Result Value Ref Range    Glucose (POC) 106 (H) 65 - 100 mg/dL    Performed by [place] [employer], POC   Result Value Ref Range    Glucose (POC) 79 65 - 100 mg/dL    Performed by [address], POC   Result Value Ref Range    Glucose (POC) 76 65 - 100 mg/dL    Performed by Kayce Mo (PCT)    GLUCOSE, POC   Result Value Ref Range    Glucose (POC) 82 65 - 100 mg/dL    Performed by [employer], POC   Result Value Ref Range    Glucose (POC) 94 65 - 100 mg/dL    Performed by [facility]     GLUCOSE, POC   Result Value Ref Range    Glucose (POC) 74 65 - 100 mg/dL    Performed by [email or web address]     GLUCOSE, POC   Result Value Ref Range    Glucose (POC) 77 65 - 100 mg/dL    Performed by [email or web address]     GLUCOSE, POC   Result Value Ref Range    Glucose (POC) 83 65 - 100 mg/dL    Performed by [email or web address]     EKG, 12 LEAD, INITIAL   Result Value Ref Range    Ventricular Rate 102 BPM    Atrial Rate 102 BPM    P-R Interval 136 ms    QRS Duration 64 ms    Q-T Interval 354 ms    QTC Calculation (Bezet) 461 ms    Calculated P Axis 52 degrees    Calculated R Axis 15 degrees    Calculated T Axis 69 degrees    Diagnosis       Sinus tachycardia  Septal infarct , age undetermined  When compared with ECG of 01-JUL-2016 16:14,  Questionable change in QRS duration  Septal infarct is now present  Confirmed by Shay Peterson, P.V. ([id number]) on 1/9/2017 2:36:15 PM     TYPE & SCREEN   Result Value Ref Range    Crossmatch Expiration 01/11/2017     ABO/Rh(D) Gerilyn Clonts POSITIVE     Antibody screen NEG       EKG  Sinus tachycardia     Imaging review:  CT Head  No evidence of acute intracranial process. Small air-fluid level in  the right maxillary sinus. Recommend correlation for sinusitis.     IMPRESSION:  Patient Active Problem List Diagnosis Code    Closed bimalleolar fracture of right ankle S82.841A    DM (diabetes mellitus) ([facility]) E11.9    HTN (hypertension) I10    Gastroparesis K31.84    DM gastroparesis ([facility]) E11.43, K31.84    Diabetes ([facility] E11.9    Type II or unspecified type diabetes mellitus with neurological manifestations, uncontrolled E11.49    Orthostatic hypotension I95.1    Noncompliance Z91.19    Encounter for long-term (current) use of insulin ([facility]) Z79.4    Hyperglycemia due to type 2 diabetes mellitus ([facility] E11.65    Gangrene of foot ([facility] I96    ARF (acute renal failure) ([facility]) N17.9    Neurogenic bladder N31.9    Pneumonia due to infectious organism J18.9    SIRS (systemic inflammatory response syndrome) ([facility]) R65.10    Hyponatremia E87.1    Diabetic gastroparesis associated with type 2 diabetes mellitus ([facility]) E11.43, K31.84    DKA (diabetic ketoacidoses) ([facility]) E13.10    Diabetic peripheral neuropathy associated with type 2 diabetes mellitus ([facility] E11.42    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    Quadriplegia and quadriparesis ([facility]) G82.50    GBS (Guillain [place] syndrome) ([facility] G61.0    Sepsis ([facility] A41.9    Diabetes mellitus with foot ulcer and gangrene ([facility] E11.621, L97.509, E11.52    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 ([facility]) T87.53    Quadriplegia ([facility] G82.50    Iron deficiency anemia D50.9    Decubitus ulcer L89.90    Chronic bilateral low back pain without sciatica M54.5, G89.29    Weakness R53.1    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 female who presents with altered mental status and possible seizure activity. She was on low dose keppra and that was increased to 1000 mg po bid. EEG does not show seizures. MRI brain pending. She does also have UTI and that could be the contributing factor for her encephalopathy. Treatment as per primary physician. RECOMMENDATIONS:  - Continue keppra to 1000 mg po bid  - EEG - encephalopathy  - MRI brain without contrast - pending  - Seizure precautions. Thank you very much for this consultation. We will follow up on the above studies and give further recommendations as indicated.       Aung Eid MD

## 2017-01-10 NOTE — PROGRESS NOTES
* No surgery found *  Bedside and Verbal shift change report given to Rhonda Dolan (oncoming nurse) by Aleksander Medina (offgoing nurse). Report included the following information SBAR, Kardex, Intake/Output, MAR and Recent Results.     Zone Phone:   [phone]      Significant changes during shift:  Specialty bed        Patient Information    Jade Scott  60 y.o.  1/8/2017  4:29 PM by Yasmin Olivas MD. Jade Scott was admitted from Home    Problem List    Patient Active Problem List    Diagnosis Date Noted    UTI (urinary tract infection) 01/08/2017    Sacral pain 09/21/2016    Delirium due to another medical condition 09/21/2016    Debility 09/21/2016    Unresponsive episode 09/21/2016    Altered mental status 09/20/2016    GI bleed 09/20/2016    Goals of care, counseling/discussion 07/22/2016    DNR (do not resuscitate) discussion 07/22/2016    Diabetes mellitus with foot ulcer and gangrene ([address] 07/21/2016    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 ([address] 07/21/2016    Quadriplegia ([address] 07/21/2016    Iron deficiency anemia 07/21/2016    Decubitus ulcer 07/21/2016    Chronic bilateral low back pain without sciatica 07/21/2016    Weakness 07/21/2016    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) ([address] 07/21/2016    Sepsis ([address] 07/20/2016    GBS (Guillain [place] syndrome) ([address] 07/09/2016    Diabetic peripheral neuropathy associated with type 2 diabetes mellitus ([address] 07/07/2016    Idiopathic progressive polyneuropathy 07/07/2016    Quadriplegia and quadriparesis ([address] 07/07/2016    DKA (diabetic ketoacidoses) ([address] 07/01/2016    Gangrene of foot ([address] 04/24/2016    ARF (acute renal failure) ([address] 04/24/2016    Neurogenic bladder 04/24/2016    Pneumonia due to infectious organism 04/24/2016    SIRS (systemic inflammatory response syndrome) ([address] 04/24/2016    Hyponatremia 04/24/2016    Diabetic gastroparesis associated with type 2 diabetes mellitus ([facility] 04/24/2016    Hyperglycemia due to type 2 diabetes mellitus ([facility] 04/23/2016    Encounter for [facility]-term (current) use [facility] insulin [facility]     DM gastroparesis [facility]     Diabetes ([facility]     Type II or unspecified type diabetes mellitus with neurological manifestations, uncontrolled     Orthostatic hypotension     Noncompliance     Gastroparesis 04/19/2014    Closed bimalleolar fracture of right ankle 04/16/2014    DM (diabetes mellitus) ([facility] [address] 04/16/2014    HTN (hypertension) 04/16/2014     Past Medical History   Diagnosis Date    Colonoscopy refused 11-16-15    DM gastroparesis ([facility] [address]      gastric pacer    Encounter for long-term (current) use of insulin ([facility])     Gastrointestinal disorder      GERD    HTN (hypertension)     Noncompliance     Orthostatic hypotension     Quadriplegia ([facility]     Stroke ([facility]     Type II or unspecified type diabetes mellitus with neurological manifestations, uncontrolled          Core Measures:    CVA: NO NO  CHF:NO NO  PNA:NO NO    Post Op Surgical (If Applicable):     Number times ambulated in hallway past shift:  0  Number of times OOB to chair past shift:   0  NG Tube: NO  Incentive Spirometer: NO  Drains: NO   Volume  0  Dressing Present:  NO  Flatus:  YES    Activity Status:    OOB to Chair NO  Ambulated this shift NO   Bed Rest YES    Supplemental O2: (If Applicable)    NC NO  NRB NO  Venti-mask NO  On 0 Liters/min      LINES AND DRAINS:    Central Line? NO Placement date  Reason Medically Necessary     PICC LINE? NO Placement date Reason Medically Necessary     Urinary Catheter? YES Placement Date PTA (patient refused to have changed in ED) Reason Medically Necessary stage 4 pressure ulcer    DVT prophylaxis:    DVT prophylaxis Med- YES  DVT prophylaxis SCD or BERT- NO     Wounds: (If Applicable)    Wounds- YES    Location see flowsheet    Patient Safety:    Falls Score Total Score: 5  Safety Level___III____  Bed Alarm On? YES  Sitter?  NO    Plan for upcoming shift: monitor for safety        Discharge Plan: YES     Active Consults:  IP CONSULT TO NEUROLOGY

## 2017-01-11 NOTE — PROGRESS NOTES
Hospitalist Progress Note    NAME: Yan Jiménez   :  1960   MRN:  [id number]     Interim Hospital Summary: 60 y.o. female whom presented on 2017 with      Assessment / Plan:    Acute Encephalopathy, post ictal vs baseline  Seizures?  -CT head nothing acute. Ammonia 35. TSH 1.25  -continue increased dose of keppra  -MRI pending  -EEG showed encephalopathy and no ongoing seizures  -Neurology input appreciated. Discussed with Dr Margo Garber  -family is pursuing LTC placement for this patient. They do not want to take her back home because they don't feel they can deal with her anymore. Patient is mean and verbally abusive to family. Patient was just discharged from [facility] rehab last month. Patient's sister (caregiver) just recently had a stroke and a  shut placed and is in no position to help anymore. Discussed with CM. Needs UAI. UTI   -Urine culture growing possible pseudomonas and ESBL organism so will stop Rocephin today and start Meropenem    Hypokalemia  -replete PO K and IV mg.  Recheck in AM    Anemia  -iron indices consistent with chronic disease (low iron, low TIBC, high ferritin)  -Hg up after transfusion 1 unit. Follow     DM (HgA1c 6.6)  -SSI prn    Pressure ulcers / full thickness wounds POA  -involves left heel, dorsum left foot, sacral area and right hip. Appreciate wound care input and recommendations. Please refer to their initial assessment note   for details. S/P Right BKA    Diarrhea  -C diff negative. No diarrhea noted    Code status: Full  Prophylaxis: Lovenox or heparin SQ  Recommended Disposition: TBD       Subjective:     Chief Complaint / Reason for Physician Visit  Follow up of encephalopathy, anemia, UTI, diarrhea  Chart reviewed in detail. Discussed with RN events overnight. Remains confused. Urine culture results noted.      Review of Systems:  Symptom Y/N Comments  Symptom Y/N Comments Fever/Chills    Chest Pain n    Poor Appetite    Edema     Cough    Abdominal Pain     Sputum    Joint Pain     SOB/BONNER n   Pruritis/Rash     Nausea/vomit n   Tolerating PT/OT     Diarrhea    Tolerating Diet     Constipation    Other       Could NOT obtain due to:      PO intake:   Patient Vitals for the past 72 hrs:   % Diet Eaten   01/10/17 1912 25 %   01/10/17 1251 75 %     Objective:     VITALS:   Last 24hrs VS reviewed since prior progress note. Most recent are:  Patient Vitals for the past 24 hrs:   Temp Pulse Resp BP SpO2   01/11/17 0906 - 91 - 164/85 -   01/11/17 0901 - 95 - (!) 89/61 -   01/11/17 0858 - 88 - 168/87 -   01/11/17 0807 98.3 °F (36.8 °C) 88 18 (!) 161/100 99 %   01/11/17 0402 98.4 °F (36.9 °C) 95 18 (!) 165/92 100 %   01/11/17 0020 98.4 °F (36.9 °C) 91 18 (!) 180/91 100 %   01/10/17 2045 98.3 °F (36.8 °C) 97 18 (!) 167/95 100 %   01/10/17 2030 98.4 °F (36.9 °C) 98 18 (!) 184/99 100 %   01/10/17 2001 98.2 °F (36.8 °C) 81 18 161/81 100 %   01/10/17 1920 98.5 °F (36.9 °C) 95 18 159/81 100 %   01/10/17 1901 98.4 °F (36.9 °C) 99 18 177/82 100 %   01/10/17 1854 98.6 °F (37 °C) (!) 101 18 165/81 100 %   01/10/17 1827 98.6 °F (37 °C) 100 18 167/61 100 %   01/10/17 1510 98.6 °F (37 °C) 98 18 163/90 98 %   01/10/17 1159 98 °F (36.7 °C) 98 20 169/82 97 %       Intake/Output Summary (Last 24 hours) at 01/11/17 1045  Last data filed at 01/11/17 0704   Gross per 24 hour   Intake              195 ml   Output             1050 ml   Net             -855 ml        PHYSICAL EXAM:  General: WD, WN. Alert, somewhat cooperative, chronically ill appearing  EENT:  EOMI. Anicteric sclerae. MMM  Resp:  CTA bilaterally, no wheezing or rales.   No accessory muscle use  CV:  Regular  rhythm,  No edema  GI:  Soft, Non distended, Non tender.  +Bowel sounds  Neurologic:  Alert and not oriented to situation, place or year, normal speech,   Psych:   Poor insight. Not anxious nor agitated  Skin:  (+) left heel and sacral pressure ulcers with wounds over top of left foot and right hip area. See wound care note    Reviewed most current lab test results and cultures  YES  Reviewed most current radiology test results   YES  Review and summation of old records today    NO  Reviewed patient's current orders and MAR    YES  PMH/SH reviewed - no change compared to H&P  ________________________________________________________________________  Care Plan discussed with:    Comments   Patient x    Family  x Niece by phone   RN x    Care Manager x DC planning   Consultant                       x Multidiciplinary team rounds were held today with , nursing, pharmacist and clinical coordinator. Patient's plan of care was discussed; medications were reviewed and discharge planning was addressed. ________________________________________________________________________  Total NON critical care TIME:  35   Minutes    Total CRITICAL CARE TIME Spent:   Minutes non procedure based      Comments   >50% of visit spent in counseling and coordination of care x DC planning discussions. Obtaining more history from family. This includes time during multidisciplinary rounds if indicated above   ________________________________________________________________________  Karina Ayers MD     Procedures: see electronic medical records for all procedures/Xrays and details which were not copied into this note but were reviewed prior to creation of Plan. LABS:  I reviewed today's most current labs and imaging studies.   Pertinent labs include:  Recent Labs      01/11/17   0441  01/10/17   0545  01/09/17   0059   WBC  11.9*  12.0*  12.5*   HGB  8.9*  6.8*  8.0*   HCT  28.2*  22.6*  26.8*   PLT  494*  516*  558*     Recent Labs      01/11/17   0441  01/10/17   0545  01/09/17   0059  01/08/17   1733   NA  146*  140  141  142   K  2.9*  3.2*  3.2*  3.4*   CL  113*  109*  106  105   CO2  23  20*  25  26   GLU  101*  69  330*  362*   BUN  10  11  18 17   CREA  0.46*  0.47*  0.77  0.83   CA  7.1*  7.3*  7.9*  7.7*   MG  1.6   --    --   1.7   ALB   --    --   1.1*  1.0*   TBILI   --    --   0.1*  0.2   SGOT   --    --   18  27   ALT   --    --   12  13

## 2017-01-11 NOTE — PROGRESS NOTES
NEUROLOGY progress note     SUBJECTIVE   - Pt seems to be calmer than yesterday  - Continues to have hallucinations. - EEG showed encephalopathy and no ongoing seizures  - MRI brain pending    To recap,  Pt cannot provide any meaningful history. History is obtained by chart review. EMS was called by family members because the patient was found on the floor covered in stool. He was brought to the hospital but did have a seizure like event that lasted for 30 seconds in the truck. He did have a CT head in ER and that was normal. Blood work showed low Hb, UTI and leukocytosis. Patients baseline is not known. Pt has been taking keppra 500 mg po bid at home. ROS  A ten system review of constitutional, cardiovascular, respiratory, musculoskeletal, endocrine, skin, SHEENT, genitourinary, psychiatric and neurologic systems was obtained and is unremarkable except as stated in HPI     PHYSICAL EXAM  EXAMINATION:   Visit Vitals    /85 (BP 1 Location: Right arm, BP Patient Position: Supine; Post activity)    Pulse 91    Temp 98.3 °F (36.8 °C)    Resp 18    Ht 5' 5\" (1.651 m)    SpO2 99%        General:   General appearance: Pt is in no acute distress   Distal pulses are preserved  Fundoscopic exam: attempted    Neurological Examination:   Mental Status: AAO x2 (pt is able to tell her name and that she is in the hospital, told the year is 2016). Speech is fluent. Follows some commands, but not consistently, has poor fund of knowledge, attention, short term recall, comprehension and insight. Cranial Nerves:  PERRL, Extraocular movements are full. Facial sensation could not be assessed. Facial movement intact, symmetric. Hearing intact to conversation. Palate elevates symmetrically. Shoulder shrug symmetric. Tongue midline. Motor: Strength - moves the upper ext symm and it is 4-/5, right lower ext is amputated and LLE is 2/5.     Coordination/Cerebellar: unable to assess    Gait: deferred    Skin: No significant bruising or lacerations. CURRENT MEDS  Current Facility-Administered Medications   Medication Dose Route Frequency    lactobac ac& pc-s.therm-b.anim (LINSEY Q/RISAQUAD)  1 Cap Oral DAILY    levETIRAcetam (KEPPRA) tablet 1,000 mg  1,000 mg Oral BID    collagenase (SANTYL) 250 unit/gram ointment   Topical DAILY    ferrous sulfate tablet 325 mg  325 mg Oral TID WITH MEALS    magnesium oxide (MAG-OX) tablet 400 mg  400 mg Oral BID    pantoprazole (PROTONIX) tablet 40 mg  40 mg Oral ACB    sertraline (ZOLOFT) tablet 50 mg  50 mg Oral DAILY    insulin lispro (HUMALOG) injection   SubCUTAneous AC&HS    cefTRIAXone (ROCEPHIN) 1 g in 0.9% sodium chloride (MBP/ADV) 50 mL  1 g IntraVENous Q24H    sodium chloride (NS) flush 5-10 mL  5-10 mL IntraVENous Q8H    heparin (porcine) injection 5,000 Units  5,000 Units SubCUTAneous Q8H       LAB DATA REVIEWED:    Results for orders placed or performed during the hospital encounter of 01/08/17   C.  DIFFICILE (DNA)   Result Value Ref Range    C. difficile (DNA) NEGATIVE  NEG     CULTURE, URINE   Result Value Ref Range    Special Requests: NO SPECIAL REQUESTS      Harrison Count >100,000  COLONIES/mL        Culture result:        GRAM NEGATIVE RODS  CHECKING FOR POSSIBLE  ** (EXTENDED SPECTRUM BETA LACTAMASE ) **      Culture result: PSEUDOMONAS AERUGINOSA         Susceptibility    Pseudomonas aeruginosa - LILIANA     Amikacin ($) <=16 Susceptible ug/mL     Aztreonam ($$$$) 16 Intermediate ug/mL     Cefepime ($$) 8 Susceptible ug/mL     Ceftazidime ($) 16 Intermediate ug/mL     Ciprofloxacin ($) <=1 Susceptible ug/mL     Gentamicin ($) <=4 Susceptible ug/mL     Imipenem <=1 Susceptible ug/mL     Levofloxacin ($) >4 Resistant ug/mL     Meropenem ($$) <=1 Susceptible ug/mL     Piperacillin/Tazobac ($) 64 Susceptible ug/mL     Tobramycin ($) <=4 Susceptible ug/mL   CULTURE, BLOOD   Result Value Ref Range    Special Requests: NO SPECIAL REQUESTS      Culture result: NO GROWTH 3 DAYS     CBC WITH AUTOMATED DIFF   Result Value Ref Range    WBC 12.0 (H) 3.6 - 11.0 K/uL    RBC 3.11 (L) 3.80 - 5.20 M/uL    HGB 7.2 (L) 11.5 - 16.0 g/dL    HCT 24.3 (L) 35.0 - 47.0 %    MCV 78.1 (L) 80.0 - 99.0 FL    MCH 23.2 (L) 26.0 - 34.0 PG    MCHC 29.6 (L) 30.0 - 36.5 g/dL    RDW 15.1 (H) 11.5 - 14.5 %    PLATELET 224 (H) 408 - 400 K/uL    NEUTROPHILS 85 (H) 32 - 75 %    LYMPHOCYTES 11 (L) 12 - 49 %    MONOCYTES 4 (L) 5 - 13 %    EOSINOPHILS 0 0 - 7 %    BASOPHILS 0 0 - 1 %    ABS. NEUTROPHILS 10.2 (H) 1.8 - 8.0 K/UL    ABS. LYMPHOCYTES 1.3 0.8 - 3.5 K/UL    ABS. MONOCYTES 0.5 0.0 - 1.0 K/UL    ABS. EOSINOPHILS 0.0 0.0 - 0.4 K/UL    ABS. BASOPHILS 0.0 0.0 - 0.1 K/UL    DF SMEAR SCANNED      RBC COMMENTS HYPOCHROMIA  1+        RBC COMMENTS MICROCYTOSIS  1+       METABOLIC PANEL, COMPREHENSIVE   Result Value Ref Range    Sodium 142 136 - 145 mmol/L    Potassium 3.4 (L) 3.5 - 5.1 mmol/L    Chloride 105 97 - 108 mmol/L    CO2 26 21 - 32 mmol/L    Anion gap 11 5 - 15 mmol/L    Glucose 362 (H) 65 - 100 mg/dL    BUN 17 6 - 20 MG/DL    Creatinine 0.83 0.55 - 1.02 MG/DL    BUN/Creatinine ratio 20 12 - 20      GFR est AA >60 >60 ml/min/1.73m2    GFR est non-AA >60 >60 ml/min/1.73m2    Calcium 7.7 (L) 8.5 - 10.1 MG/DL    Bilirubin, total 0.2 0.2 - 1.0 MG/DL    ALT 13 12 - 78 U/L    AST 27 15 - 37 U/L    Alk.  phosphatase 387 (H) 45 - 117 U/L    Protein, total 6.2 (L) 6.4 - 8.2 g/dL    Albumin 1.0 (L) 3.5 - 5.0 g/dL    Globulin 5.2 (H) 2.0 - 4.0 g/dL    A-G Ratio 0.2 (L) 1.1 - 2.2     MAGNESIUM   Result Value Ref Range    Magnesium 1.7 1.6 - 2.4 mg/dL   TROPONIN I   Result Value Ref Range    Troponin-I, Qt. <0.04 <0.05 ng/mL   CK W/ REFLX CKMB   Result Value Ref Range     26 - 192 U/L   URINALYSIS W/ REFLEX CULTURE   Result Value Ref Range    Color YELLOW/STRAW      Appearance OPAQUE (A) CLEAR      Specific gravity 1.025 1.003 - 1.030      pH (UA) 6.0 5.0 - 8.0      Protein >300 (A) NEG mg/dL    Glucose 250 (A) NEG mg/dL    Ketone TRACE (A) NEG mg/dL    Bilirubin NEGATIVE  NEG      Blood LARGE (A) NEG      Urobilinogen 0.2 0.2 - 1.0 EU/dL    Nitrites NEGATIVE  NEG      Leukocyte Esterase LARGE (A) NEG      WBC >100 (H) 0 - 4 /hpf    RBC  0 - 5 /hpf    Epithelial cells FEW FEW /lpf    Bacteria 1+ (A) NEG /hpf    UA:UC IF INDICATED URINE CULTURE ORDERED (A) CNI     LACTIC ACID, PLASMA   Result Value Ref Range    Lactic acid 1.6 0.4 - 2.0 MMOL/L   LACTIC ACID, PLASMA   Result Value Ref Range    Lactic acid 1.7 0.4 - 2.0 MMOL/L   HEMOGLOBIN A1C WITH EAG   Result Value Ref Range    Hemoglobin A1c 6.6 (H) 4.2 - 6.3 %    Est. average glucose 143 mg/dL   TSH 3RD GENERATION   Result Value Ref Range    TSH 1.25 0.36 - 3.74 uIU/mL   LIPID PANEL   Result Value Ref Range    LIPID PROFILE          Cholesterol, total 136 <200 MG/DL    Triglyceride 156 (H) <150 MG/DL    HDL Cholesterol 35 MG/DL    LDL, calculated 69.8 0 - 100 MG/DL    VLDL, calculated 31.2 MG/DL    CHOL/HDL Ratio 3.9 0 - 5.0     IRON PROFILE   Result Value Ref Range    Iron 22 (L) 35 - 150 ug/dL    TIBC 68 (L) 250 - 450 ug/dL    Iron % saturation 32 20 - 50 %   LEVETIRACETAM (KEPPRA)   Result Value Ref Range    Levetiracetam (Keppra) None detected 10.0 - 40.0 ug/mL   AMMONIA   Result Value Ref Range    Ammonia 35 (H) <32 UMOL/L   DRUG SCREEN, URINE   Result Value Ref Range    AMPHETAMINE NEGATIVE  NEG      BARBITURATES NEGATIVE  NEG      BENZODIAZEPINE NEGATIVE  NEG      COCAINE NEGATIVE  NEG      METHADONE NEGATIVE  NEG      OPIATES NEGATIVE  NEG      PCP(PHENCYCLIDINE) NEGATIVE  NEG      THC (TH-CANNABINOL) NEGATIVE  NEG      Drug screen comment (NOTE)    FERRITIN   Result Value Ref Range    Ferritin 1064 (H) 8 - 252 NG/ML   CBC WITH AUTOMATED DIFF   Result Value Ref Range    WBC 12.5 (H) 3.6 - 11.0 K/uL    RBC 3.42 (L) 3.80 - 5.20 M/uL    HGB 8.0 (L) 11.5 - 16.0 g/dL    HCT 26.8 (L) 35.0 - 47.0 %    MCV 78.4 (L) 80.0 - 99.0 FL    MCH 23.4 (L) 26.0 - 34.0 PG    MCHC 29.9 (L) 30.0 - 36.5 g/dL    RDW 15.1 (H) 11.5 - 14.5 %    PLATELET 469 (H) 826 - 400 K/uL    NEUTROPHILS 83 (H) 32 - 75 %    LYMPHOCYTES 14 12 - 49 %    MONOCYTES 3 (L) 5 - 13 %    EOSINOPHILS 0 0 - 7 %    BASOPHILS 0 0 - 1 %    ABS. NEUTROPHILS 10.3 (H) 1.8 - 8.0 K/UL    ABS. LYMPHOCYTES 1.8 0.8 - 3.5 K/UL    ABS. MONOCYTES 0.4 0.0 - 1.0 K/UL    ABS. EOSINOPHILS 0.1 0.0 - 0.4 K/UL    ABS. BASOPHILS 0.0 0.0 - 0.1 K/UL   METABOLIC PANEL, COMPREHENSIVE   Result Value Ref Range    Sodium 141 136 - 145 mmol/L    Potassium 3.2 (L) 3.5 - 5.1 mmol/L    Chloride 106 97 - 108 mmol/L    CO2 25 21 - 32 mmol/L    Anion gap 10 5 - 15 mmol/L    Glucose 330 (H) 65 - 100 mg/dL    BUN 18 6 - 20 MG/DL    Creatinine 0.77 0.55 - 1.02 MG/DL    BUN/Creatinine ratio 23 (H) 12 - 20      GFR est AA >60 >60 ml/min/1.73m2    GFR est non-AA >60 >60 ml/min/1.73m2    Calcium 7.9 (L) 8.5 - 10.1 MG/DL    Bilirubin, total 0.1 (L) 0.2 - 1.0 MG/DL    ALT 12 12 - 78 U/L    AST 18 15 - 37 U/L    Alk. phosphatase 399 (H) 45 - 117 U/L    Protein, total 7.0 6.4 - 8.2 g/dL    Albumin 1.1 (L) 3.5 - 5.0 g/dL    Globulin 5.9 (H) 2.0 - 4.0 g/dL    A-G Ratio 0.2 (L) 1.1 - 2.2     CBC WITH AUTOMATED DIFF   Result Value Ref Range    WBC 12.0 (H) 3.6 - 11.0 K/uL    RBC 2.88 (L) 3.80 - 5.20 M/uL    HGB 6.8 (L) 11.5 - 16.0 g/dL    HCT 22.6 (L) 35.0 - 47.0 %    MCV 78.5 (L) 80.0 - 99.0 FL    MCH 23.6 (L) 26.0 - 34.0 PG    MCHC 30.1 30.0 - 36.5 g/dL    RDW 15.2 (H) 11.5 - 14.5 %    PLATELET 928 (H) 784 - 400 K/uL    NEUTROPHILS 81 (H) 32 - 75 %    LYMPHOCYTES 15 12 - 49 %    MONOCYTES 4 (L) 5 - 13 %    EOSINOPHILS 0 0 - 7 %    BASOPHILS 0 0 - 1 %    ABS. NEUTROPHILS 9.7 (H) 1.8 - 8.0 K/UL    ABS. LYMPHOCYTES 1.8 0.8 - 3.5 K/UL    ABS. MONOCYTES 0.5 0.0 - 1.0 K/UL    ABS. EOSINOPHILS 0.0 0.0 - 0.4 K/UL    ABS.  BASOPHILS 0.0 0.0 - 0.1 K/UL    RBC COMMENTS ANISOCYTOSIS  1+        RBC COMMENTS HYPOCHROMIA  1+        RBC COMMENTS MICROCYTOSIS  PRESENT        DF SMEAR SCANNED     RETICULOCYTE COUNT   Result Value Ref Range    Reticulocyte count 1.5 0.7 - 2.1 %   METABOLIC PANEL, BASIC   Result Value Ref Range    Sodium 140 136 - 145 mmol/L    Potassium 3.2 (L) 3.5 - 5.1 mmol/L    Chloride 109 (H) 97 - 108 mmol/L    CO2 20 (L) 21 - 32 mmol/L    Anion gap 11 5 - 15 mmol/L    Glucose 69 65 - 100 mg/dL    BUN 11 6 - 20 MG/DL    Creatinine 0.47 (L) 0.55 - 1.02 MG/DL    BUN/Creatinine ratio 23 (H) 12 - 20      GFR est AA >60 >60 ml/min/1.73m2    GFR est non-AA >60 >60 ml/min/1.73m2    Calcium 7.3 (L) 8.5 - 10.1 MG/DL   NUCLEATED RBC   Result Value Ref Range    NRBC 0.1 (H) 0  WBC    ABSOLUTE NRBC 0.02 (H) 0.00 - 0.01 K/uL    WBC CORRECTED FOR NR ADJUSTED FOR NUCLEATED RBC'S     CBC WITH AUTOMATED DIFF   Result Value Ref Range    WBC 11.9 (H) 3.6 - 11.0 K/uL    RBC 3.62 (L) 3.80 - 5.20 M/uL    HGB 8.9 (L) 11.5 - 16.0 g/dL    HCT 28.2 (L) 35.0 - 47.0 %    MCV 77.9 (L) 80.0 - 99.0 FL    MCH 24.6 (L) 26.0 - 34.0 PG    MCHC 31.6 30.0 - 36.5 g/dL    RDW 14.9 (H) 11.5 - 14.5 %    PLATELET 345 (H) 223 - 400 K/uL    NEUTROPHILS 81 (H) 32 - 75 %    LYMPHOCYTES 13 12 - 49 %    MONOCYTES 5 5 - 13 %    EOSINOPHILS 1 0 - 7 %    BASOPHILS 0 0 - 1 %    ABS. NEUTROPHILS 9.8 (H) 1.8 - 8.0 K/UL    ABS. LYMPHOCYTES 1.5 0.8 - 3.5 K/UL    ABS. MONOCYTES 0.6 0.0 - 1.0 K/UL    ABS. EOSINOPHILS 0.1 0.0 - 0.4 K/UL    ABS.  BASOPHILS 0.0 0.0 - 0.1 K/UL   METABOLIC PANEL, BASIC   Result Value Ref Range    Sodium 146 (H) 136 - 145 mmol/L    Potassium 2.9 (L) 3.5 - 5.1 mmol/L    Chloride 113 (H) 97 - 108 mmol/L    CO2 23 21 - 32 mmol/L    Anion gap 10 5 - 15 mmol/L    Glucose 101 (H) 65 - 100 mg/dL    BUN 10 6 - 20 MG/DL    Creatinine 0.46 (L) 0.55 - 1.02 MG/DL    BUN/Creatinine ratio 22 (H) 12 - 20      GFR est AA >60 >60 ml/min/1.73m2    GFR est non-AA >60 >60 ml/min/1.73m2    Calcium 7.1 (L) 8.5 - 10.1 MG/DL   MAGNESIUM   Result Value Ref Range    Magnesium 1.6 1.6 - 2.4 mg/dL   GLUCOSE, POC   Result Value Ref Range    Glucose (POC) 306 (H) 65 - 100 mg/dL    Performed by Floyd Lacey, POC   Result Value Ref Range    Glucose (POC) 106 (H) 65 - 100 mg/dL    Performed by Floyd Lacey, POC   Result Value Ref Range    Glucose (POC) 79 65 - 100 mg/dL    Performed by Ruth 6, POC   Result Value Ref Range    Glucose (POC) 76 65 - 100 mg/dL    Performed by Chetna Sosa (PCT)    GLUCOSE, POC   Result Value Ref Range    Glucose (POC) 82 65 - 100 mg/dL    Performed by Ruth 6, POC   Result Value Ref Range    Glucose (POC) 94 65 - 100 mg/dL    Performed by Roseann Portillo     GLUCOSE, POC   Result Value Ref Range    Glucose (POC) 74 65 - 100 mg/dL    Performed by Roseann Portillo     GLUCOSE, POC   Result Value Ref Range    Glucose (POC) 77 65 - 100 mg/dL    Performed by Roseann Portillo     GLUCOSE, POC   Result Value Ref Range    Glucose (POC) 83 65 - 100 mg/dL    Performed by Roseann Portillo     GLUCOSE, POC   Result Value Ref Range    Glucose (POC) 87 65 - 100 mg/dL    Performed by Danie Ibarra    GLUCOSE, POC   Result Value Ref Range    Glucose (POC) 94 65 - 100 mg/dL    Performed by Danie Ibarra    GLUCOSE, POC   Result Value Ref Range    Glucose (POC) 123 (H) 65 - 100 mg/dL    Performed by Roseann Portillo     GLUCOSE, POC   Result Value Ref Range    Glucose (POC) 109 (H) 65 - 100 mg/dL    Performed by Kiki Cullen (PCT)    EKG, 12 LEAD, INITIAL   Result Value Ref Range    Ventricular Rate 102 BPM    Atrial Rate 102 BPM    P-R Interval 136 ms    QRS Duration 64 ms    Q-T Interval 354 ms    QTC Calculation (Bezet) 461 ms    Calculated P Axis 52 degrees    Calculated R Axis 15 degrees    Calculated T Axis 69 degrees    Diagnosis       Sinus tachycardia  Septal infarct , age undetermined  When compared with ECG of 01-JUL-2016 16:14,  Questionable change in QRS duration  Septal infarct is now present  Confirmed by Joss Herrera, P.V. ([id number]) on 1/9/2017 2:36:15 PM     TYPE & SCREEN   Result Value Ref Range    Crossmatch Expiration 01/11/2017     ABO/Rh(D) O POSITIVE     Antibody screen NEG     Unit number [id number]     Blood component type RC LR AS1     Unit division 00     Status of unit TRANSFUSED     Crossmatch result Compatible       EKG  Sinus tachycardia     Imaging review:  CT Head  No evidence of acute intracranial process. Small air-fluid level in  the right maxillary sinus. Recommend correlation for sinusitis.     EEG  Encephalopathy    IMPRESSION:  Patient Active Problem List   Diagnosis Code    Closed bimalleolar fracture of right ankle S82.841A    DM (diabetes mellitus) ([facility] E11.9    HTN (hypertension) I10    Gastroparesis K31.84    DM gastroparesis ([facility]) E11.43, K31.84    Diabetes ([facility] E11.9    Type II or unspecified type diabetes mellitus with neurological manifestations, uncontrolled E11.49    Orthostatic hypotension I95.1    Noncompliance Z91.19    Encounter for long-term (current) use of insulin ([facility]) Z79.4    Hyperglycemia due to type 2 diabetes mellitus ([facility] E11.65    Gangrene of foot ([facility] I96    ARF (acute renal failure) ([facility]) N17.9    Neurogenic bladder N31.9    Pneumonia due to infectious organism J18.9    SIRS (systemic inflammatory response syndrome) ([facility]) R65.10    Hyponatremia E87.1    Diabetic gastroparesis associated with type 2 diabetes mellitus ([facility]) E11.43, K31.84    DKA (diabetic ketoacidoses) ([facility]) E13.10    Diabetic peripheral neuropathy associated with type 2 diabetes mellitus ([facility] E11.42    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    Quadriplegia and quadriparesis ([facility]) G82.50    GBS (Guillain [place] syndrome) ([facility] G61.0    Sepsis ([facility] A41.9    Diabetes mellitus with foot ulcer and gangrene ([facility] E11.621, L97.509, E11.52    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 ([facility]) T87.53    Quadriplegia ([facility]) G82.50    Iron deficiency anemia D50.9    Decubitus ulcer L89.90    Chronic bilateral low back pain without sciatica M54.5, G89.29    Weakness R53.1    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 female who presents with altered mental status and possible seizure activity. She was on low dose keppra and that was increased to 1000 mg po bid. EEG does not show seizures. MRI brain pending. She does also have UTI and that could be the contributing factor for her encephalopathy. She is much improved today. RECOMMENDATIONS:  - Continue keppra to 1000 mg po bid  - EEG - encephalopathy with no seizure activity.  - MRI brain without contrast - pending  - Seizure precautions.          Perfecto Miller MD

## 2017-01-11 NOTE — PROGRESS NOTES
Bedside and Verbal shift change report given to Macy Davalos (oncoming nurse) by Adele Taylor RN (offgoing nurse). Report included the following information SBAR, Kardex, Intake/Output, MAR and Recent Results.     Zone Phone:   [phone]      Significant changes during shift:  none        Patient Information    Michelle Stone  60 y.o.  1/8/2017  4:29 PM by Emmanuel Banda MD. Michelle Stone was admitted from Home    Problem List    Patient Active Problem List    Diagnosis Date Noted    UTI (urinary tract infection) 01/08/2017    Sacral pain 09/21/2016    Delirium due to another medical condition 09/21/2016    Debility 09/21/2016    Unresponsive episode 09/21/2016    Altered mental status 09/20/2016    GI bleed 09/20/2016    Goals of care, counseling/discussion 07/22/2016    DNR (do not resuscitate) discussion 07/22/2016    Diabetes mellitus with foot ulcer and gangrene ([address] 07/21/2016    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 ([address] 07/21/2016    Quadriplegia ([address] 07/21/2016    Iron deficiency anemia 07/21/2016    Decubitus ulcer 07/21/2016    Chronic bilateral low back pain without sciatica 07/21/2016    Weakness 07/21/2016    Axonal GBS (Guillain-Pinson syndrome) ([address] 07/21/2016    Sepsis ([address] 07/20/2016    GBS (Guillain Pinson syndrome) ([address] 07/09/2016    Diabetic peripheral neuropathy associated with type 2 diabetes mellitus ([address] 07/07/2016    Idiopathic progressive polyneuropathy 07/07/2016    Quadriplegia and quadriparesis ([address] 07/07/2016    DKA (diabetic ketoacidoses) ([address] 07/01/2016    Gangrene of foot ([address] 04/24/2016    ARF (acute renal failure) ([address] 04/24/2016    Neurogenic bladder 04/24/2016    Pneumonia due to infectious organism 04/24/2016    SIRS (systemic inflammatory response syndrome) ([address] 04/24/2016    Hyponatremia 04/24/2016    Diabetic gastroparesis associated with type 2 diabetes mellitus ([facility] 04/24/2016    Hyperglycemia due to type 2 diabetes mellitus ([facility] 04/23/2016    Encounter for long-term (current) use of insulin ([facility]     DM gastroparesis ([facility]     Diabetes ([facility]     Type II or unspecified type diabetes mellitus with neurological manifestations, uncontrolled     Orthostatic hypotension     Noncompliance     Gastroparesis 04/19/2014    Closed bimalleolar fracture of right ankle 04/16/2014    DM (diabetes mellitus) ([facility] 04/16/2014    HTN (hypertension) 04/16/2014     Past Medical History   Diagnosis Date    Colonoscopy refused 11-16-15    DM gastroparesis ([facility]      gastric pacer    Encounter for long-term (current) use of insulin (HCC)     Gastrointestinal disorder      GERD    HTN (hypertension)     Noncompliance     Orthostatic hypotension     Quadriplegia ([facility]     Stroke ([facility]     Type II or unspecified type diabetes mellitus with neurological manifestations, uncontrolled          Core Measures:    CVA: NO NO  CHF:NO NO  PNA:NO NO    Post Op Surgical (If Applicable):     Number times ambulated in hallway past shift:  0  Number of times OOB to chair past shift:   0  NG Tube: NO  Incentive Spirometer: NO  Drains: NO   Volume  0  Dressing Present:  NO  Flatus:  YES    Activity Status:    OOB to Chair NO  Ambulated this shift NO   Bed Rest YES    Supplemental O2: (If Applicable)    NC NO  NRB NO  Venti-mask NO  On 0 Liters/min      LINES AND DRAINS:    Urinary Catheter? YES Placement Date PTA (patient refused to have changed in ED) Reason Medically Necessary stage 4 pressure ulcer    DVT prophylaxis:    DVT prophylaxis Med- YES  DVT prophylaxis SCD or BERT- NO     Wounds: (If Applicable)    Wounds- YES    Location see flowsheet    Patient Safety:    Falls Score Total Score: 5  Safety Level___III____  Bed Alarm On? YES  Sitter?  NO    Plan for upcoming shift: monitor for safety, monitor blood sugars        Discharge Plan: YES ongoing    Active Consults:  IP CONSULT TO NEUROLOGY

## 2017-01-11 NOTE — PROGRESS NOTES
TRANSFER - OUT REPORT:    Verbal report given to Lincoln on Michelle Close  being transferred to Oncology for routine progression of care       Report consisted of patients Situation, Background, Assessment and   Recommendations(SBAR). Information from the following report(s) SBAR, Kardex, Intake/Output, MAR and Recent Results was reviewed with the receiving nurse. Lines:   Peripheral IV 01/09/17 Left Arm (Active)   Site Assessment Clean, dry, & intact 1/11/2017  8:58 AM   Phlebitis Assessment 0 1/11/2017  8:58 AM   Infiltration Assessment 0 1/11/2017  8:58 AM   Dressing Status Clean, dry, & intact 1/11/2017  8:58 AM   Dressing Type Transparent;Tape 1/11/2017  4:10 AM   Hub Color/Line Status Blue; Infusing 1/11/2017  4:10 AM        Opportunity for questions and clarification was provided.

## 2017-01-11 NOTE — DIABETES MGMT
[facility]:    Consult received. Attempted to meet with pt to complete assessment of home management, however pt not appropriate for education at this time. Will f/u at later time if pt becomes more appropriate. Note BG levels WNL at this time.      Thank you,    Андрей Borrego, [address] [place] [address], [address]  Office:  [phone]

## 2017-01-11 NOTE — PROGRESS NOTES
Problem: Mobility Impaired (Adult and Pediatric)  Goal: *Acute Goals and Plan of Care (Insert Text)  Physical Therapy Goals  Initiated 1/11/2017  1. Patient will move from supine to sit and sit to supine , scoot up and down and roll side to side in bed with minimal assistance/contact guard assist within 7 day(s). 2. Patient will transfer from bed to chair and chair to bed with maximal assistance using the least restrictive device within 7 day(s). 3. Patient will sit on EOB ~ 5 minutes demonstrating good sitting balance within 7 days. PHYSICAL THERAPY EVALUATION  Patient: Dao Aldridge (83 y.o. female)  Date: 1/11/2017  Primary Diagnosis: UTI (urinary tract infection)        Precautions:  Fall      ASSESSMENT : Chart reviewed. Patient cleared by nursing for PT evaluation. Based on the objective data described below, the patient presents with impaired sitting balance, significant weakness, impaired ROM, R BKA, numerous wounds, decreased command following, increased confusion, orthostatic hypotension, and decreased independence following admission for UTI. Patient is a poor historian, therefore PLOF not fully obtained. Patient received supine in bed and agreeable to PT evaluation. Patient more pleasant and following ~ 50% of commands this date. /87 in supine. Patient transferred to EOB with mod-max A x 1-2. Patient demonstrated overall poor sitting balance, requiring total A x 1 to maintain sitting. Patient demonstrated initiation with use of core ms to improve sitting balance as patient with significant posterior lean and attempted to shift weight forward, however unable without total A. BP 89/61 while sitting on EOB, however patient's only complaint was increased fatigue. Patient was returned supine as further intervention/mobility deemed inappropriate. /85 in supine. Patient was left supine in bed in NAD with all needs met and nursing informed.  Recommend patient discharge to SNF rehab vs LTC pending progress in acute PT. Patient will continue to benefit from skilled acute PT to improve strength and balance. Patient will benefit from skilled intervention to address the above impairments. Patients rehabilitation potential is considered to be Fair  Factors which may influence rehabilitation potential include:   [ ]         None noted  [X]         Mental ability/status  [X]         Medical condition  [ ]         Home/family situation and support systems  [X]         Safety awareness  [ ]         Pain tolerance/management  [ ]         Other:        PLAN :  Recommendations and Planned Interventions:  [X]           Bed Mobility Training             [ ]    Neuromuscular Re-Education  [X]           Transfer Training                   [ ]    Orthotic/Prosthetic Training  [ ]           Gait Training                         [ ]    Modalities  [X]           Therapeutic Exercises           [ ]    Edema Management/Control  [X]           Therapeutic Activities            [X]    Patient and Family Training/Education  [ ]           Other (comment):     Frequency/Duration: Patient will be followed by physical therapy  3 times a week to address goals. Discharge Recommendations: Aashish Henry vs [facility], To Be Determined  Further Equipment Recommendations for Discharge: TBD by rehab/facility       SUBJECTIVE:   Patient stated I'm tired this morning.       OBJECTIVE DATA SUMMARY:   HISTORY:    Past Medical History   Diagnosis Date    Colonoscopy refused 11-16-15    DM gastroparesis ([address]         gastric pacer    Encounter for long-term (current) use of insulin (HCC)      Gastrointestinal disorder         GERD    HTN (hypertension)      Noncompliance      Orthostatic hypotension      Quadriplegia ([address]      Stroke [facility]      Type II or unspecified type diabetes mellitus with neurological manifestations, uncontrolled       Past Surgical History   Procedure Laterality Date    Hx gi gastroenteric pacemaker    Hx above knee amputation           May 2016     Prior Level of Function/Home Situation: pt is a poor historian, however reports mobilizing bed>W/C using sliding board while in rehab/therapy; per chart, patient is from home, however patient reporting and with wrist band stating patient is from [facility] (nursing reporting LTC)  Personal factors and/or comorbidities impacting plan of care: H/o CVA; R BKA; orthostatic hypotension; DM II     Home Situation  Home Environment: [address] [place] [address] Name: Aashish Henry: Family member(s)     EXAMINATION/PRESENTATION/DECISION MAKING:   Critical Behavior:  Neurologic State: Alert, Confused  Orientation Level: Oriented to person, Disoriented to place, Disoriented to situation, Disoriented to time  Cognition: Decreased command following, Decreased attention/concentration     Strength:    Strength: Generally decreased, functional                    Tone & Sensation:   Tone: Normal                              Range Of Motion:  AROM: Generally decreased, functional           PROM: Generally decreased, functional           Coordination:  Coordination: Generally decreased, functional     Functional Mobility:  Bed Mobility:  Rolling:  Moderate assistance  Supine to Sit: Maximum assistance  Sit to Supine: Maximum assistance  Scooting: Maximum assistance  Transfers:  Sit to Stand:  (Not appropraite to attempt this date)                          Balance:   Sitting: Impaired  Sitting - Static: Poor (constant support)  Sitting - Dynamic: Poor (constant support)     Functional Measure:  Masters Balance Test:  Sitting to Standin  Standing Unsupported: 0  Sitting with Back Unsupported: 0  Standing to Sittin  Transfers: 0  Standing Unsupported with Eyes Closed: 0  Standing Unsupported with Feet Together: 0  Reach Forward with Outstretched Arm: 0   Object: 0  Turn to Look Over Shoulders: 0  Turn 360 Degrees: 0  Alternate Foot on Step/Stool: 0  Standing Unsupported One Foot in Front: 0  Stand on One Le  Total: 0             56=Maximum possible score;   0-20=High fall risk  21-40=Moderate fall risk   41-56=Low fall risk      Brewster Balance Test and G-code impairment scale:  Percentage of Impairment CH     0%    CI     1-19% CJ     20-39% CK     40-59% CL     60-79% CM     80-99% CN      100%   Brewster   Score 0-56 56 45-55 34-44 23-33 12-22 1-11 0            G codes: In compliance with CMSs Claims Based Outcome Reporting, the following G-code set was chosen for this patient based on their primary functional limitation being treated: The outcome measure chosen to determine the severity of the functional limitation was the BREWSTER with a score of 0/56 which was correlated with the impairment scale.       · Mobility - Walking and Moving Around:               - CURRENT STATUS:    CN - 100% impaired, limited or restricted               - GOAL STATUS:           CM - 80%-99% impaired, limited or restricted               - D/C STATUS:                       ---------------To be determined---------------      Physical Therapy Evaluation Charge Determination   History Examination Presentation Decision-Making   HIGH Complexity :3+ comorbidities / personal factors will impact the outcome/ POC  HIGH Complexity : 4+ Standardized tests and measures addressing body structure, function, activity limitation and / or participation in recreation  HIGH Complexity : Unstable and unpredictable characteristics  Other outcome measures BREWSTER  HIGH       Based on the above components, the patient evaluation is determined to be of the following complexity level: HIGH      Pain:  Pain Scale 1: Numeric (0 - 10)  Pain Intensity 1: 0              Activity Tolerance:   Poor - BP with significant drop from supine to sitting, however only complaint from patient was increased fatigue  Please refer to the flowsheet for vital signs taken during this treatment. After treatment:   [ ]         Patient left in no apparent distress sitting up in chair  [X]         Patient left in no apparent distress in bed  [X]         Call bell left within reach  [X]         Nursing notified  [ ]         Caregiver present  [X]         Bed alarm activated      COMMUNICATION/EDUCATION:   The patients plan of care was discussed with: Physical Therapist and Registered Nurse.  [X]         Fall prevention education was provided and the patient/caregiver indicated understanding. [X]         Patient/family have participated as able in goal setting and plan of care. [X]         Patient/family agree to work toward stated goals and plan of care. [ ]         Patient understands intent and goals of therapy, but is neutral about his/her participation. [ ]         Patient is unable to participate in goal setting and plan of care.      Thank you for this referral.  Rikki Callaway, PT, DPT   Time Calculation: 14 mins

## 2017-01-12 NOTE — DIABETES MGMT
[facility]:    Consult received. Spoke with pt nurse and not appropriate for education at this time. Will f/u at later time if pt becomes more appropriate.      Thank you,    Vandana Duran, [address] [facility]  Office: [phone]

## 2017-01-12 NOTE — PROGRESS NOTES
Called pt's. Niece, [phone], who has been caregiver, to discuss  DC plans. I had to leave a message. I do know that the niece is interested in LTC for the pt. She could start out as skilled and then, transition to LTC. Pt. Is currently on [facility] for ESBL UTI. She participated well with PT yesterday. I will send out referrals for LTC and get a UAI done. CM following . -- Melinda Valverde RN, [facility]--[phone]    Care Management Interventions  PCP Verified by CM:  Yes  Mode of Transport at Discharge: BLS  Transition of Care Consult (CM Consult): SNF  MyChart Signup: No  Discharge Durable Medical Equipment: No  Health Maintenance Reviewed: Yes  Physical Therapy Consult: Yes  Occupational Therapy Consult: No  Speech Therapy Consult: No  Current Support Network: Lives with Caregiver, Other  Confirm Follow Up Transport: Other (see comment)  Plan discussed with Pt/Family/Caregiver: Yes  Freedom of Choice Offered: Yes  Discharge Location  Discharge Placement: Skilled nursing facility

## 2017-01-12 NOTE — PROGRESS NOTES
1542 Received notification that patient's urine culture positive for CRE. Informed Aimee Archer in infection control. Informed supervisor Dawit Goldberg as well.

## 2017-01-12 NOTE — PROGRESS NOTES
Bedside and Verbal shift change report given to Shawna Abarca RN (oncoming nurse) by Areli Martell RN (offgoing nurse).  Report included the following information SBAR, Kardex, Intake/Output, MAR and Recent Results.     Zone Phone: [phone]        Significant changes during shift: none           Patient Information     Fede Sweeney  60 y.o.  1/8/2017 4:29 PM by Kobe Gallegos MD. Fede Sweeney was admitted from Home     Problem List          Patient Active Problem List     Diagnosis Date Noted    UTI (urinary tract infection) 01/08/2017    Sacral pain 09/21/2016    Delirium due to another medical condition 09/21/2016    Debility 09/21/2016    Unresponsive episode 09/21/2016    Altered mental status 09/20/2016    GI bleed 09/20/2016    Goals of care, counseling/discussion 07/22/2016    DNR (do not resuscitate) discussion 07/22/2016    Diabetes mellitus with foot ulcer and gangrene ([address] 07/21/2016    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 ([address] 07/21/2016    Quadriplegia ([address] 07/21/2016    Iron deficiency anemia 07/21/2016    Decubitus ulcer 07/21/2016    Chronic bilateral low back pain without sciatica 07/21/2016    Weakness 07/21/2016    Axonal GBS (Guillain-Montalba syndrome) ([address] 07/21/2016    Sepsis ([address] 07/20/2016    GBS (Guillain Montalba syndrome) ([address] 07/09/2016    Diabetic peripheral neuropathy associated with type 2 diabetes mellitus ([address] 07/07/2016    Idiopathic progressive polyneuropathy 07/07/2016    Quadriplegia and quadriparesis ([address] 07/07/2016    DKA (diabetic ketoacidoses) ([address] 07/01/2016    Gangrene of foot ([address] 04/24/2016    ARF (acute renal failure) ([address] 04/24/2016    Neurogenic bladder 04/24/2016    Pneumonia due to infectious organism 04/24/2016    SIRS (systemic inflammatory response syndrome) ([address] 04/24/2016    Hyponatremia 04/24/2016    Diabetic gastroparesis associated with type 2 diabetes mellitus ([facility] 04/24/2016    Hyperglycemia due to type 2 diabetes mellitus ([facility] 04/23/2016    Encounter for long-term (current) use of insulin ([facility]      DM gastroparesis ([facility]      Diabetes ([facility]      Type II or unspecified type diabetes mellitus with neurological manifestations, uncontrolled      Orthostatic hypotension      Noncompliance      Gastroparesis 04/19/2014    Closed bimalleolar fracture of right ankle 04/16/2014    DM (diabetes mellitus) ([facility] 04/16/2014    HTN (hypertension) 04/16/2014            Past Medical History   Diagnosis Date    Colonoscopy refused 11-16-15    DM gastroparesis ([facility]         gastric pacer    Encounter for long-term (current) use of insulin (HCC)      Gastrointestinal disorder         GERD    HTN (hypertension)      Noncompliance      Orthostatic hypotension      Quadriplegia (HCC)      Stroke (HCC)      Type II or unspecified type diabetes mellitus with neurological manifestations, uncontrolled              Core Measures:     CVA: NO NO  CHF:NO NO  PNA:NO NO     Post Op Surgical (If Applicable):      n/a     Activity Status:     OOB to Chair NO  Ambulated this shift NO   Bed Rest YES     Supplemental O2: (If Applicable)     n/a     LINES AND DRAINS:     Urinary Catheter? YES Placement Date PTA (patient refused to have changed in ED) Reason Medically Necessary stage 4 pressure ulcer     DVT prophylaxis:     DVT prophylaxis Med- YES heparin  DVT prophylaxis SCD or BERT- NO      Wounds: (If Applicable)     Wounds- YES     Location see flowsheet     Patient Safety:     Falls Score Total Score: 5  Safety Level___III____  Bed Alarm On? YES  Sitter?  NO     Plan for upcoming shift: monitor urinary output, safety, use of call bell           Discharge Plan: YES ongoing     Active Consults:  IP CONSULT TO NEUROLOGY

## 2017-01-12 NOTE — CDMP QUERY
2. Dr Jacobo Marquez  Please clarify if this patient is being treated/managed for:    =>sepsis poa in setting of UTI due to chronic indwelling villaseñor, wounds noted, confusion, sepsis noted in Ed.   =>Other Explanation of clinical findings  =>Unable to Determine (no explanation of clinical findings)    The medical record reflects the following clinical findings, treatment, and risk factors:    Risk Factors: age  Clinical Indicators: wbc 12-12, left shift, lactic acid 1.6-1.7, tachycardia 101-117; confusion, multiple source of infection(stage 4 PU on sacrum/wound on top of right foot with purulent drainage, deep tissue injury to left heel)/UTI,   Treatment: serial labs, 1 liter bolus ivf, [email or web address] Wood culture pending. Please clarify and document your clinical opinion in the progress notes and discharge summary including the definitive and/or presumptive diagnosis, (suspected or probable), related to the above clinical findings. Please include clinical findings supporting your diagnosis. Thank Berto Winn RN/CDMP  [phone]

## 2017-01-12 NOTE — PROGRESS NOTES
9700 Left message for PICC team to request assistance in obtaining labs. Labs attempted by staff multiple times, unsuccessful.

## 2017-01-12 NOTE — CDMP QUERY
1. Dr. Masoud Miller MD :  Please clarify if this patient is being treated/managed for:    =>UTI due ton chronic indwelling villaseñor in setting of villaseñor present on admission, UTI+, pressure ulcers noted   =>Other Explanation of clinical findings  =>Unable to Determine (no explanation of clinical findings)    The medical record reflects the following clinical findings, treatment, and risk factors:    Risk Factors: age  Clinical Indicators: noted Villaseñor in Ed on arrival, UTI+  Treatment: tried to change villaseñor but pt refused, [email or web address], [email or web address]    Please clarify and document your clinical opinion in the progress notes and discharge summary including the definitive and/or presumptive diagnosis, (suspected or probable), related to the above clinical findings. Please include clinical findings supporting your diagnosis. Thank Favian Hernandes RN/CDMP  [phone]

## 2017-01-12 NOTE — PROGRESS NOTES
Primary Nurse Isabel Montemayor RN and Beulah Figueredo RN performed a dual skin assessment on this patient Impairment noted- see wound doc flow sheet  Jose Alberto score is 14

## 2017-01-12 NOTE — PROGRESS NOTES
PCT called that patient had hard time forming out words or expressive aphasia/slurred speech which was noticed by nursing upon assessment, then suddenly came back to normal which the patient noticed for herself. Patient stated this is the third time that happened to her, the first time was last week. Attending MD notified. 1353H   Patient has loose bowel movement x 4 as of this time and and continuously oozing which the patient stated happens after every meal. Patient c/c burning pain during bowel movement. Patient has stage 3 sacral wound and stage 1 / sore areas on her buttocks.

## 2017-01-12 NOTE — PROGRESS NOTES
Lab report received from Lucie Thomas: Urine Cx taken on 1/8/2017 is positive for ESBL and CRE. Attending MD notified.

## 2017-01-12 NOTE — PROGRESS NOTES
Hospitalist Progress Note    NAME: Priyanka Lindsey   :  1960   MRN:  [id number]     Interim Hospital Summary: 60 y.o. female whom presented on 2017 with      Assessment / Plan:    Acute Encephalopathy, post ictal vs TIA vs from UTI  Seizures  -patient described to have had a 30sec seizure like event in truck en route to hospital.  While in hospital, nursing has observed intermittent episodes of slurred speech or difficulty speaking. This may be her baseline. -CT head nothing acute. Ammonia 35. TSH 1.25  -continue increased dose of keppra  -MRI pending - ordered 4 days ago. Having difficulty completing screen. Currently waiting for paper work regarding her gastric pacemaker? (per nursing)  -EEG showed encephalopathy and no ongoing seizures    -Neurology input appreciated. -family is pursuing LTC placement for this patient. They do not want to take her back home because they don't feel they can deal with her anymore. Patient is mean and verbally abusive to family. Patient was just discharged from [facility] court rehab last month. Patient's sister (caregiver) just recently had a stroke and a  shut placed and is in no position to help anymore. Discussed with CM. Needs UAI. -patient is likely at her baseline mental status now. UTI   -Urine culture growing Pseudomonas and an ESBL organism. Stopped Rocephin and started Meropenem . Day 2/7. Will hold on PICC line as she may not get a bed this week. If a bed becomes available tomorrow, could get a PICC inserted and have her complete her course a the SNF. -add probiotic    Addendum 5pm:  Lab called and final cultures reviewed. She has CRE ! Reviewed sensitivities with Dr Mariama Goncalves by phone. Since not bacteremic and clinically improving, will switch to oral Cipro to finish her course. Discontinue meropenem. Will have to ask CM to inform the SNF about this CRE. Hypokalemia  -repleted PO K and IV mg. Repeat labs were not drawn today. Will re order for AM    Anemia  -iron indices consistent with chronic disease (low iron, low TIBC, high ferritin)  -Hg up after transfusion 1 unit. Follow     DM (HgA1c 6.6)  -SSI prn    Pressure ulcers / full thickness wounds POA  -involves left heel, dorsum left foot, sacral area and right hip. Appreciate wound care input and recommendations. Please refer to their initial assessment note 1/09  for details. S/P Right BKA    Diarrhea  -C diff negative. No diarrhea noted    Code status: Full  Prophylaxis: Lovenox or heparin SQ  Recommended Disposition: TBD       Subjective:     Chief Complaint / Reason for Physician Visit  Follow up of encephalopathy, anemia, UTI, diarrhea  Chart reviewed in detail. Discussed with RN events overnight. Remains confused. Review of Systems:  Symptom Y/N Comments  Symptom Y/N Comments   Fever/Chills    Chest Pain n    Poor Appetite    Edema     Cough    Abdominal Pain     Sputum    Joint Pain     SOB/BONNER n   Pruritis/Rash     Nausea/vomit n   Tolerating PT/OT     Diarrhea    Tolerating Diet     Constipation    Other       Could NOT obtain due to:      PO intake:   Patient Vitals for the past 72 hrs:   % Diet Eaten   01/11/17 0906 75 %   01/10/17 1912 25 %   01/10/17 1251 75 %     Objective:     VITALS:   Last 24hrs VS reviewed since prior progress note. Most recent are:  Patient Vitals for the past 24 hrs:   Temp Pulse Resp BP SpO2   01/12/17 0616 - 88 - 186/89 -   01/12/17 0547 - 90 20 (!) 183/103 100 %   01/11/17 2257 98.2 °F (36.8 °C) 95 16 (!) 168/97 100 %   01/11/17 1706 98.3 °F (36.8 °C) 66 16 (!) 151/100 95 %       Intake/Output Summary (Last 24 hours) at 01/12/17 1234  Last data filed at 01/12/17 0522   Gross per 24 hour   Intake                0 ml   Output              725 ml   Net             -725 ml        PHYSICAL EXAM:  General: WD, WN.  Alert, somewhat cooperative, chronically ill appearing  EENT:  EOMI. Anicteric sclerae. MMM  Resp:  CTA bilaterally, no wheezing or rales. No accessory muscle use  CV:  Regular  rhythm,  No edema  GI:  Soft, Non distended, Non tender.  +Bowel sounds  Neurologic:  Alert and not oriented to situation, place or year, normal speech,   Psych:   Poor insight. Not anxious nor agitated  Skin:  (+) left heel and sacral pressure ulcers with wounds over top of left foot and right hip area. See wound care note    Reviewed most current lab test results and cultures  YES  Reviewed most current radiology test results   YES  Review and summation of old records today    NO  Reviewed patient's current orders and MAR    YES  PMH/SH reviewed - no change compared to H&P  ________________________________________________________________________  Care Plan discussed with:    Comments   Patient x    Family   Niece by phone   RN x    Care Manager x DC planning   Consultant                        Multidiciplinary team rounds were held today with , nursing, pharmacist and clinical coordinator. Patient's plan of care was discussed; medications were reviewed and discharge planning was addressed. ________________________________________________________________________  Total NON critical care TIME:  25   Minutes    Total CRITICAL CARE TIME Spent:   Minutes non procedure based      Comments   >50% of visit spent in counseling and coordination of care x DC planning discussions. Obtaining more history from family. This includes time during multidisciplinary rounds if indicated above   ________________________________________________________________________  Karina Ayers MD     Procedures: see electronic medical records for all procedures/Xrays and details which were not copied into this note but were reviewed prior to creation of Plan. LABS:  I reviewed today's most current labs and imaging studies.   Pertinent labs include:  Recent Labs      01/11/17   0449 01/10/17   0545   WBC  11.9*  12.0*   HGB  8.9*  6.8*   HCT  28.2*  22.6*   PLT  494*  516*     Recent Labs      01/11/17   0441  01/10/17   0545   NA  146*  140   K  2.9*  3.2*   CL  113*  109*   CO2  23  20*   GLU  101*  69   BUN  10  11   CREA  0.46*  0.47*   CA  7.1*  7.3*   MG  1.6   --

## 2017-01-13 NOTE — PROGRESS NOTES
I did have a long conversation with Adrianna Wilson about pt's. care. I told her I had sent several  referrals to SNFs in the area, including [facility], where pt. Had been previously. She stated that she just couldn't take care of her. She was trying to take care of her other aunt, that had the CVA. [facility] came by yesterday, and they were interested, but called back today and stated they couldn't take her because she need an isolation room and they couldn't provide it. [facility] was interested. Today, I found out that pt. Has CRE, and nurses are 1:1 for these pts. The final report just came out yesterday evening. Dr. Moe Martin  discharged pt., but I'm not going to be able to get her to a SNF. She has also exhausted all her Medicare days and would have to go in as straight Medicaid. This also makes it more difficult to place her. I have sent a referral to [facility] and they will send their rep. to check on the pt.  CM will be following. -- OSMAR Narayanan,ZWR--[phone]

## 2017-01-13 NOTE — PROGRESS NOTES
Oncology Nursing Communication Tool      7:26 AM  1/13/2017     Bedside shift change report given to Gina Zhang , RN (incoming nurse) by Leisa Kendall RN (outgoing nurse) on Cem Petit a 60 y.o. female who was admitted on 1/8/2017  4:29 PM. Report included the following information SBAR, Kardex, ED Summary, Procedure Summary, Intake/Output, MAR and Recent Results. Significant changes during shift: CRE  Infection       Issues for physician to address: None          Code Status: Full Code     Infections: CRE, ESBL, MRSA     Allergies: Review of patient's allergies indicates no known allergies.      Current diet: DIET DIABETIC CONSISTENT CARB Regular  DIET NUTRITIONAL SUPPLEMENTS Lunch, Dinner; Glucerna Shake  DIET NUTRITIONAL SUPPLEMENTS Breakfast; Gelatein 20       Pain Controlled [x] yes [] no   Bowel Movement [x] yes [] no   Last Bowel Movement (date)     01/13/2017            Vital Signs:   Patient Vitals for the past 12 hrs:   Temp Pulse Resp BP SpO2   01/13/17 0614 98.2 °F (36.8 °C) 84 - 159/85 100 %   01/12/17 2223 98.1 °F (36.7 °C) 87 18 152/86 98 %      Intake & Output:     Intake/Output Summary (Last 24 hours) at 01/13/17 0726  Last data filed at 01/13/17 1737   Gross per 24 hour   Intake              470 ml   Output              200 ml   Net              270 ml      Laboratory Results:     Recent Results (from the past 12 hour(s))   GLUCOSE, POC    Collection Time: 01/12/17  9:53 PM   Result Value Ref Range    Glucose (POC) 158 (H) 65 - 100 mg/dL    Performed by [facility]    METABOLIC PANEL, BASIC    Collection Time: 01/13/17  4:30 AM   Result Value Ref Range    Sodium 143 136 - 145 mmol/L    Potassium 3.2 (L) 3.5 - 5.1 mmol/L    Chloride 112 (H) 97 - 108 mmol/L    CO2 21 21 - 32 mmol/L    Anion gap 10 5 - 15 mmol/L    Glucose 130 (H) 65 - 100 mg/dL    BUN 10 6 - 20 MG/DL    Creatinine 0.53 (L) 0.55 - 1.02 MG/DL    BUN/Creatinine ratio 19 12 - 20      GFR est AA >60 >60 ml/min/1.73m2    GFR est non-AA >60 >60 ml/min/1.73m2    Calcium 7.6 (L) 8.5 - 10.1 MG/DL   MAGNESIUM    Collection Time: 01/13/17  4:30 AM   Result Value Ref Range    Magnesium 2.1 1.6 - 2.4 mg/dL              Opportunity for questions and clarifications were given to the incoming nurse. Patient's bed is in low position, side rails x2, door open PRN, call bell within reach and patient not in distress.       Marion Cherry RN

## 2017-01-13 NOTE — DIABETES MGMT
DTC- attempted to see for assessment of home management- patient remains inappropriate for education. Please consider re-consulting DTC with patient is appropriate for education.     Carly Olivarez, [address] [place] [address], [address]  Office:  [phone]

## 2017-01-13 NOTE — PROGRESS NOTES
Hospitalist Progress Note    NAME: Yajaira Pike   :  1960   MRN:  [id number]     Interim Hospital Summary: 60 y.o. female whom presented on 2017 with      Assessment / Plan:  UTI POA due to indwelling Snowden   UC: CRE and pseudomonas. S/p CTX since admission changed to Meropenem  for ESBL, total AB day should be counting from    [facility] NTD   C/w  Probiotic  Holding Zoloft while on cipro ( to avoid QTc prolongation)   Dr Cotto Mo d/w Dr Violet Oliver by phone: since not bacteremic and clinically improving, OK to switch to oral Cipro to finish her course - day 3/7     Hypokalemia   Supplement as needed   DC on daily K, follow weekly BMP OP     Acute Encephalopathy POA  DDx: post ictal vs TIA vs metabolic encephalopathy due to UTI  Seizures  Sx: patient described to have had a 30sec seizure like event in truck en route to hospital.  While in hospital, nursing has observed intermittent episodes of slurred speech or difficulty speaking. This is likely her baseline. Overall MS stable while in the hospital.    -CT head nothing acute. Ammonia 35. TSH 1.25  -continue increased dose of keppra  -MRI pending - ordered 5 days ago. Having difficulty completing screen. Currently waiting for paper work regarding her gastric pacemaker? (per nursing) - can be done as OP if bed at SNF available prior to it   -EEG showed encephalopathy and no ongoing seizures    -Neurology input appreciated. -family is pursuing LTC placement for this patient. They do not want to take her back home because they don't feel they can deal with her anymore. Patient is mean and verbally abusive to family. Patient was just discharged from [facility] court rehab last month. Patient's sister (caregiver) just recently had a stroke and a  shut placed and is in no position to help anymore.        Anemia  - baseline Hb 8.3, admission 7.2--> down to 6.8, s/p 1 PRBC  -iron indices consistent with chronic disease (low iron, low TIBC, high ferritin)  -Hg up after transfusion 1 unit. Follow OP     DM (HgA1c 6.6)  -BS stable   -SSI prn  -restart home Metformin on discharge     Pressure ulcers / full thickness wounds POA  Involves left heel, dorsum left foot, sacral area and right hip. Appreciate wound care input and recommendations. Please refer to their initial assessment note 1/09  for details. S/P Right BKA    Diarrhea  -C diff negative. No diarrhea noted  - stop Mag oxide ( can cause diarrhea)       Code status: Full  Prophylaxis: heparin SQ  Recommended Disposition: SNF once bed available        Subjective:     Chief Complaint / Reason for Physician Visit: following encephalopathy, anemia, UTI, diarrhea  MS: remain stable - confused/ uncooperative on and off    Discussed with RN events overnight. Review of Systems:  Symptom Y/N Comments  Symptom Y/N Comments   Fever/Chills    Chest Pain n    Poor Appetite    Edema     Cough    Abdominal Pain     Sputum    Joint Pain     SOB/BONNER n   Pruritis/Rash     Nausea/vomit n   Tolerating PT/OT     Diarrhea    Tolerating Diet     Constipation    Other       Could NOT obtain due to:      PO intake:   Patient Vitals for the past 72 hrs:   % Diet Eaten   01/12/17 1200 50 %   01/12/17 0800 75 %   01/11/17 0906 75 %   01/10/17 1912 25 %   01/10/17 1251 75 %     Objective:     VITALS:   Last 24hrs VS reviewed since prior progress note. Most recent are:  Patient Vitals for the past 24 hrs:   Temp Pulse Resp BP SpO2   01/13/17 0614 98.2 °F (36.8 °C) 84 - 159/85 100 %   01/12/17 2223 98.1 °F (36.7 °C) 87 18 152/86 98 %   01/12/17 1400 - 93 18 145/80 100 %   01/12/17 1307 - 95 18 164/90 100 %       Intake/Output Summary (Last 24 hours) at 01/13/17 0802  Last data filed at 01/13/17 0224   Gross per 24 hour   Intake              230 ml   Output              200 ml   Net               30 ml        PHYSICAL EXAM:  General: WD, WN.  Alert, cooperative, chronically ill appearing  EENT:  EOMI. Anicteric sclerae. MMM  Resp:  CTA bilaterally, no wheezing or rales. No accessory muscle use  CV:  Regular  rhythm,  No edema  GI:  Soft, Non distended, Non tender.  +Bowel sounds  Neurologic:  AAO x 2-3, normal speech, non focal  Extr:                R BKA  Psych:   Poor insight. Not anxious nor agitated  Skin:  (+) left heel and sacral pressure ulcers with wounds over top of left foot and right hip area. See wound care note    Reviewed most current lab test results and cultures  YES  Reviewed most current radiology test results   YES  Review and summation of old records today    NO  Reviewed patient's current orders and MAR    YES  PMH/SH reviewed - no change compared to H&P  ________________________________________________________________________  Care Plan discussed with:    Comments   Patient x    Family      RN x    Care Manager x DC planning   Consultant                        Multidiciplinary team rounds were held today with , nursing, pharmacist and clinical coordinator. Patient's plan of care was discussed; medications were reviewed and discharge planning was addressed. ________________________________________________________________________  Total NON critical care TIME:  25   Minutes    Total CRITICAL CARE TIME Spent:   Minutes non procedure based      Comments   >50% of visit spent in counseling and coordination of care x DC planning    ________________________________________________________________________  Dom Rich MD     Procedures: see electronic medical records for all procedures/Xrays and details which were not copied into this note but were reviewed prior to creation of Plan. LABS:  I reviewed today's most current labs and imaging studies.   Pertinent labs include:  Recent Labs      01/11/17   0441   WBC  11.9*   HGB  8.9*   HCT  28.2*   PLT  494*     Recent Labs      01/13/17   0430  01/12/17   1821  01/11/17   0441   NA  143  142  146* K  3.2*  3.6  2.9*   CL  112*  112*  113*   CO2  21  21  23   GLU  130*  114*  101*   BUN  10  10  10   CREA  0.53*  0.63  0.46*   CA  7.6*  7.8*  7.1*   MG  2.1  2.2  1.6

## 2017-01-13 NOTE — PROGRESS NOTES
Bedside shift change report given to [address] (oncoming nurse) by Galen Frank (offgoing nurse). Report included the following information SBAR.

## 2017-01-13 NOTE — PROGRESS NOTES
Patient identified in change of shift report as feed assist.  Fed patient breakfast complete care. Patient stated she had fed herself before but unclear on specifics, patient able to move upper extremities. Set patient up for lunch to attempt to feed herself to assess ability. Patient unable to manipulate fork and spoon, no fine motor skills, frequently dropping silverware and food. Patient was able to handle finger foods (grapes) that did not involve silverware. No choking or swallowing difficulties evident. Spoke to OT staff who was on floor. May be able to assist with specialized fork, spoon. Obtained order from Dr. Marcia Carrero for OT consult.   Placed order, continued to monitor patient

## 2017-01-13 NOTE — PROGRESS NOTES
Attempted to see pt today for PT tx. She received Ativan in the morning and is still lethargic/drowsy. Noted pt is now on contact for CRE and needs to be the last pt seen on PT list due to contact precautions. Will continue to follow pt. Thank you.   Radha Dominguez, PT,DPT

## 2017-01-13 NOTE — DISCHARGE INSTRUCTIONS
HOSPITALIST DISCHARGE INSTRUCTIONS    NAME: Dean Bahena   :  1960   MRN:  [id number]     Date/Time:  2017 8:33 AM    ADMIT DATE: 2017   DISCHARGE DATE: 2017     Attending Physician: Darleen Alicea MD    DISCHARGE DIAGNOSIS:    UTI   Encephalopathy       Medications: Per above medication reconciliation. Pain Management: per above medications    Recommended diet: Diabetic Diet    Recommended activity: Activity as tolerated and PT/OT Eval and Treat    Wound care:   Apply Betadine swab to the left heel and allow it to dry. No other dressing needed. Sacrum Wound care to be done daily - Cleanse the wound with NS and wipe with gauze to remove the old drainage and wound debris. Apply wound gel to the Kerlix gauze and then pack the wound including the undermining area from 4 - 7 o'clock. Cover with sacral foam dressing. Daily Right HIP wound care: Cleanse with Normal Saline and wipe with gauze to remove the old drainage and wound debris. Apply a small amount of wound gel (Carrasyn) to some Kerlix gauze to pack the wound and cover with a foam dressing. Daily wound care for the LEFT top of the foot: Cleanse the wound with Normal Saline and wipe with gauze to remove the old drainage and wound debris. Apply a thick layer of the Santyl and then cover with a NS moistened Kerlix to fill the wound and then cover with a foam dressing. Document a \"1\" at the top column of the wound flowsheet for debridement wound care. Indwelling devices: Snowden catheter, chronic with care per routine     Supplemental Oxygen: None    Required Lab work:    Difficult stick for blood draw. Glucose management:   Patient develops hypoglycemia even with low doses of insulin. Check FBG  Twice weekly. If >180, inform MD    Code status: Full        Outside physician follow up:     Follow-up Information     Follow up With Details Comments [address] Jluis Joyce MD In 1 week Dania Urias 59  [phone]                 Skilled nursing facility/ SNF MD responsible for above on discharge. Information obtained by :  I understand that if any problems occur once I am at home I am to contact my physician. I understand and acknowledge receipt of the instructions indicated above.                                                                                                                                            Physician's or R.N.'s Signature                                                                  Date/Time                                                                                                                                              Patient or Repres

## 2017-01-14 NOTE — PROGRESS NOTES
Hospitalist Progress Note    NAME: Priyanka Lindsey   :  1960   MRN:  [id number]     Interim Hospital Summary: 60 y.o. female whom presented on 2017. EMS was called by family members because the patient was found on the floor covered in stool. She had have a seizure like event that lasted for 30 seconds in the truck. CT head on admission was negative. Blood work showed low Hb, UTI and leukocytosis. Pt was on  keppra 500 mg po bid at home. Assessment / Plan:  UTI POA due to indwelling Snowden   UC: Klebsiella and pseudomonas. AB:CTX started on admission changed to Meropenem  for ESBL, total AB day should be counting from   Dr Oakley Beams d/w Dr Mariama Goncalves by phone: since not bacteremic and clinically improving, OK to switch to oral Cipro to finish her course - day    BC NTD   C/w  Probiotic  Holding Zoloft while on cipro ( to avoid QTc prolongation), restart       Hypokalemia   Supplement as needed. Started on daily supplement. DC on daily K, follow weekly BMP OP     Acute Encephalopathy POA  DDx: post ictal vs TIA vs metabolic encephalopathy due to UTI  Seizures  Sx: patient have had a 30sec seizure like event in truck en route to hospital.  While in hospital, nursing has observed intermittent episodes of slurred speech/stattering or difficulty speaking - this is likely her baseline. Overall MS stable while in the hospital. C/w Ativan prn which helpful.   -Neurology input appreciated:continue increased dose of keppra  -CT head nothing acute. Ammonia 35. TSH 1.25  -MRI pending, ordered on admission. Having difficulty completing screen.   Currently waiting for paper work regarding her gastric pacemaker? (per nursing) - can be done as OP if bed at SNF available prior to it   -EEG showed encephalopathy and no ongoing seizure     Anemia of chronic disease   - baseline Hb 8.3, admission 7.2--> down to 6.8, s/p 1 PRBC  -iron indices consistent with chronic disease (low iron, low TIBC, high ferritin)  -Hg up after transfusion 1 unit. Follow OP     DM type II (HgA1c 6.6)  -BS stable   -SSI prn  -restart home Metformin    Pressure ulcers / full thickness wounds POA  Involves left heel, dorsum left foot, sacral area and right hip. Appreciate wound care input and recommendations. Please refer to their initial assessment note 1/09  for details. Diarrhea, POA, resolved. C diff negative. Stop Mag oxide ( can cause diarrhea)   S/P Right BKA        Code status: Full   Niece, [id number]   Prophylaxis: heparin SQ    Baseline:pt used to live at home,   Recommended Disposition: Pt is medically stable for DC. SNF once bed available - difficult discharge   Family is pursuing LTC placement for this patient. They do not want to take her back home because they don't feel they can deal with her anymore. Patient is mean and verbally abusive to family. Patient was just discharged from [facility] court rehab last month. Patient's sister (caregiver) just recently had a stroke and a  shut placed and is in no position to help anymore. Her niece Lorraine Skelton was trying to take care of pt but not able to do it anymore either. Subjective:     Chief Complaint / Reason for Physician Visit: following encephalopathy, anemia, UTI, diarrhea  MS: remain stable - confused/ uncooperative on and off  No events overnight  Pt reports good last night sleep     Discussed with RN events overnight.        Review of Systems:  Symptom Y/N Comments  Symptom Y/N Comments   Fever/Chills    Chest Pain n    Poor Appetite    Edema     Cough    Abdominal Pain     Sputum    Joint Pain     SOB/BONNER n   Pruritis/Rash     Nausea/vomit n   Tolerating PT/OT y    Diarrhea    Tolerating Diet y    Constipation    Other       Could NOT obtain due to:      PO intake:   Patient Vitals for the past 72 hrs:   % Diet Eaten   01/12/17 1200 50 %   01/12/17 0800 75 %   01/11/17 0906 75 %     Objective: VITALS:   Last 24hrs VS reviewed since prior progress note. Most recent are:  Patient Vitals for the past 24 hrs:   Temp Pulse Resp BP SpO2   01/14/17 0557 98.3 °F (36.8 °C) 96 17 147/88 100 %   01/13/17 2231 97.7 °F (36.5 °C) 76 - 144/79 100 %   01/13/17 1415 98.4 °F (36.9 °C) 72 14 144/79 100 %       Intake/Output Summary (Last 24 hours) at 01/14/17 0725  Last data filed at 01/14/17 0553   Gross per 24 hour   Intake                0 ml   Output              450 ml   Net             -450 ml        PHYSICAL EXAM:  General: WD, WN. Alert, cooperative, chronically ill appearing  EENT:  EOMI. Anicteric sclerae. MMM  Resp:  CTA bilaterally, no wheezing or rales. No accessory muscle use  CV:  Regular  rhythm,  No edema  GI:  Soft, Non distended, Non tender.  +Bowel sounds  Neurologic:  AAO x 2-3, normal speech, non focal  Extr:                R BKA  Psych:   Poor insight. Not anxious nor agitated  Skin:  (+) left heel and sacral pressure ulcers with wounds over top of left foot and right hip area. See wound care note    Reviewed most current lab test results and cultures  YES  Reviewed most current radiology test results   YES  Review and summation of old records today    NO  Reviewed patient's current orders and MAR    YES  PMH/SH reviewed - no change compared to H&P  ________________________________________________________________________  Care Plan discussed with:    Comments   Patient x    Family      RN x    Care Manager x DC planning   Consultant                        Multidiciplinary team rounds were held today with , nursing, pharmacist and clinical coordinator. Patient's plan of care was discussed; medications were reviewed and discharge planning was addressed.      ________________________________________________________________________  Total NON critical care TIME:  20   Minutes    Total CRITICAL CARE TIME Spent:   Minutes non procedure based      Comments   >50% of visit spent in counseling and coordination of care x    ________________________________________________________________________  Duane Ramirez MD     Procedures: see electronic medical records for all procedures/Xrays and details which were not copied into this note but were reviewed prior to creation of Plan. LABS:  I reviewed today's most current labs and imaging studies. Pertinent labs include:  No results for input(s): WBC, HGB, HCT, PLT, HGBEXT, HCTEXT, PLTEXT, HGBEXT, HCTEXT, PLTEXT in the last 72 hours.   Recent Labs      01/13/17   0430  01/12/17   1821   NA  143  142   K  3.2*  3.6   CL  112*  112*   CO2  21  21   GLU  130*  114*   BUN  10  10   CREA  0.53*  0.63   CA  7.6*  7.8*   MG  2.1  2.2

## 2017-01-14 NOTE — PROGRESS NOTES
OT note: Attempted to see pt today for OT tx. She received Ativan in the morning and is still lethargic/drowsy. Noted pt is now on contact for CRE and needs to be the last pt seen on OT list due to contact precautions. Will continue to follow pt.  Thank you.

## 2017-01-14 NOTE — ROUTINE PROCESS
Bedside and Verbal shift change report given to Espinoza Man RN (oncoming nurse) by Lemuel Hayward RN (offgoing nurse). Report included the following information SBAR, Kardex, Intake/Output, MAR and Recent Results.

## 2017-01-14 NOTE — PROGRESS NOTES
Oncology Nursing Communication Tool      7:08 AM  1/14/2017     Bedside shift change report given to Crescencio Scott RN (incoming nurse) by Catrina Brizuela RN (outgoing nurse) on Cat Simeon a 60 y.o. female who was admitted on 1/8/2017  4:29 PM. Report included the following information SBAR, Kardex, ED Summary, Procedure Summary, Intake/Output, MAR and Recent Results. Significant changes during shift: None      Issues for physician to address: None          Code Status: Full Code     Infections: CRE, ESBL, MRSA     Allergies: Review of patient's allergies indicates no known allergies. Current diet: DIET DIABETIC CONSISTENT CARB Regular  DIET NUTRITIONAL SUPPLEMENTS Lunch, Dinner; Glucerna Shake  DIET NUTRITIONAL SUPPLEMENTS Breakfast; Gelatein 20       Pain Controlled [x] yes [] no   Bowel Movement [] yes [x] no   Last Bowel Movement (date)     1/13/2016            Vital Signs:   Patient Vitals for the past 12 hrs:   Temp Pulse Resp BP SpO2   01/14/17 0557 98.3 °F (36.8 °C) 96 17 147/88 100 %   01/13/17 2231 97.7 °F (36.5 °C) 76 - 144/79 100 %      Intake & Output:     Intake/Output Summary (Last 24 hours) at 01/14/17 0708  Last data filed at 01/14/17 0553   Gross per 24 hour   Intake                0 ml   Output              450 ml   Net             -450 ml      Laboratory Results:     Recent Results (from the past 12 hour(s))   GLUCOSE, POC    Collection Time: 01/13/17 10:25 PM   Result Value Ref Range    Glucose (POC) 140 (H) 65 - 100 mg/dL    Performed by Will Branch               Opportunity for questions and clarifications were given to the incoming nurse. Patient's bed is in low position, side rails x2, door open PRN, call bell within reach and patient not in distress.       Catrina Brizuela RN

## 2017-01-14 NOTE — ROUTINE PROCESS
Called and spoke with Starling Cascade in regards to the patient's 4 watery stools today and her very tender, raw skin on her bottom. Received an order to administer imodium 4 mg q4 hours prn.

## 2017-01-14 NOTE — PROGRESS NOTES
Oncology Nursing Communication Tool      7:00 PM  1/13/2017     Bedside shift change report given to Feliz Barahona RN (incoming nurse) by Torri Del Rio (outgoing nurse) on Jade De La TorreSonia a 60 y.o. female who was admitted on 1/8/2017  4:29 PM. Report included the following information SBAR, Kardex, Intake/Output, MAR and Recent Results. Significant changes during shift:       Issues for physician to address:             Code Status: Full Code     Infections: CRE, ESBL, MRSA     Allergies: Review of patient's allergies indicates no known allergies. Current diet: DIET DIABETIC CONSISTENT CARB Regular  DIET NUTRITIONAL SUPPLEMENTS Lunch, Dinner; Glucerna Shake  DIET NUTRITIONAL SUPPLEMENTS Breakfast; Gelatein 20       Pain Controlled [] yes [] no   Bowel Movement [] yes [] no   Last Bowel Movement (date)                 Vital Signs:   Patient Vitals for the past 12 hrs:   Temp Pulse Resp BP SpO2   01/13/17 1415 98.4 °F (36.9 °C) 72 14 144/79 100 %      Intake & Output:     Intake/Output Summary (Last 24 hours) at 01/13/17 1900  Last data filed at 01/13/17 0935   Gross per 24 hour   Intake                0 ml   Output              400 ml   Net             -400 ml      Laboratory Results:     Recent Results (from the past 12 hour(s))   GLUCOSE, POC    Collection Time: 01/13/17  7:31 AM   Result Value Ref Range    Glucose (POC) 124 (H) 65 - 100 mg/dL    Performed by Estrella Speed    GLUCOSE, POC    Collection Time: 01/13/17 11:36 AM   Result Value Ref Range    Glucose (POC) 144 (H) 65 - 100 mg/dL    Performed by Estrella Speed    GLUCOSE, POC    Collection Time: 01/13/17  4:30 PM   Result Value Ref Range    Glucose (POC) 112 (H) 65 - 100 mg/dL    Performed by [address]ann for questions and clarifications were given to the incoming nurse. Patient's bed is in low position, side rails x2, door open PRN, call bell within reach and patient not in distress.       Uday Arnold Margaux

## 2017-01-15 NOTE — PROGRESS NOTES
Bedside report received from  [facility], [address] [facility] (offgoing nurse). Assumed care of patient. No immediate concerns, patient resting with call bell within reach.

## 2017-01-15 NOTE — PROGRESS NOTES
Oncology Nursing Communication Tool      7:19 AM  1/15/2017     Bedside and Verbal shift change report given to Laci Hinkle, RN (incoming nurse) by Bruna Garcia, RN (outgoing nurse) on Ander Flores a 60 y.o. female who was admitted on 1/8/2017  4:29 PM. Report included the following information SBAR, Kardex, [facility], Recent Results and Med Rec Status. Significant changes during shift:   Pt states that acetaminophen (TYLENOL) is not working. Pain remains at a 10. Notified Dr. Claudia Michele who placed an order for ketoralac TORADOL injection 30 mg. Will continue to monitor pt.            Issues for physician to address:  Pain           Code Status: Full Code     Infections: CRE, ESBL, MRSA     Allergies: Review of patient's allergies indicates no known allergies.      Current diet: DIET DIABETIC CONSISTENT CARB Regular  DIET NUTRITIONAL SUPPLEMENTS Lunch, Dinner; Glucerna Shake  DIET NUTRITIONAL SUPPLEMENTS Breakfast; Gelatein 20       Pain Controlled [] yes [x] no   Bowel Movement [x] yes [] no   Last Bowel Movement (date) 1/15/2017            Vital Signs:   Patient Vitals for the past 12 hrs:   Temp Pulse Resp BP SpO2   01/15/17 0520 98.3 °F (36.8 °C) 85 17 127/70 100 %   01/14/17 2116 97.7 °F (36.5 °C) 94 18 127/72 100 %      Intake & Output:     Intake/Output Summary (Last 24 hours) at 01/15/17 0719  Last data filed at 01/14/17 2127   Gross per 24 hour   Intake              602 ml   Output              335 ml   Net              267 ml      Laboratory Results:     Recent Results (from the past 12 hour(s))   GLUCOSE, POC    Collection Time: 01/14/17  9:15 PM   Result Value Ref Range    Glucose (POC) 102 (H) 65 - 100 mg/dL    Performed by [address] [place] [address], [facility]    Collection Time: 01/15/17  3:42 AM   Result Value Ref Range    Sodium 145 136 - 145 mmol/L    Potassium 4.6 3.5 - 5.1 mmol/L    Chloride 117 (H) 97 - 108 mmol/L    CO2 18 (L) 21 - 32 mmol/L    Anion gap 10 5 - 15 mmol/L    Glucose 71 65 - 100 mg/dL    BUN 15 6 - 20 MG/DL    Creatinine 0.72 0.55 - 1.02 MG/DL    BUN/Creatinine ratio 21 (H) 12 - 20      GFR est AA >60 >60 ml/min/1.73m2    GFR est non-AA >60 >60 ml/min/1.73m2    Calcium 7.7 (L) 8.5 - 10.1 MG/DL   CBC W/O DIFF    Collection Time: 01/15/17  3:42 AM   Result Value Ref Range    WBC 11.9 (H) 3.6 - 11.0 K/uL    RBC 3.24 (L) 3.80 - 5.20 M/uL    HGB 8.0 (L) 11.5 - 16.0 g/dL    HCT 26.4 (L) 35.0 - 47.0 %    MCV 81.5 80.0 - 99.0 FL    MCH 24.7 (L) 26.0 - 34.0 PG    MCHC 30.3 30.0 - 36.5 g/dL    RDW 15.1 (H) 11.5 - 14.5 %    PLATELET 558 (H) 100 - 400 K/uL   MAGNESIUM    Collection Time: 01/15/17  3:42 AM   Result Value Ref Range    Magnesium 2.1 1.6 - 2.4 mg/dL   GLUCOSE, POC    Collection Time: 01/15/17  6:57 AM   Result Value Ref Range    Glucose (POC) 81 65 - 100 mg/dL    Performed by Dejon Juarez for questions and clarifications were given to the incoming nurse. Patient's bed is in low position, side rails x2, door open PRN, call bell within reach and patient not in distress.       Homar Santiago RN

## 2017-01-15 NOTE — PROGRESS NOTES
2100 hrs    Pt states that acetaminophen (TYLENOL) is not working. Pain remains at a 10. Notified Dr. Leticia Rainey who placed an order for ketoralac TORADOL injection 30 mg. Will continue to monitor pt.

## 2017-01-15 NOTE — PROGRESS NOTES
Problem: Pressure Ulcer - Risk of  Goal: *Prevention of pressure ulcer  Outcome: Progressing Towards Goal  Wound care completed to ulcers POA as ordered. Turning at least every two hours. Pt on speciality mattress to reduce pressure as well. Left heel elevated as she has eschar on that. Problem: Falls - Risk of  Goal: *Absence of falls  Outcome: Progressing Towards Goal  Call bell within reach, pt educated on and verbalized understanding of use. Patient specific fall prevention measures in place as documented on Fall Flowsheet.

## 2017-01-15 NOTE — PROGRESS NOTES
Pt very tearful after having loose stool in bed. Incontinence care provided with heavy layer of zinc cream put on perineal area and buttocks, gown changed, wound care completed. Pt given PRN Ativan.

## 2017-01-15 NOTE — PROGRESS NOTES
Hospitalist Progress Note    NAME: Julissa Santiago   :  1960   MRN:  [id number]     Interim Hospital Summary: 60 y.o. female whom presented on 2017. EMS was called by family members because the patient was found on the floor covered in stool. She had have a seizure like event that lasted for 30 seconds in the truck. CT head on admission was negative. Blood work showed low Hb, UTI and leukocytosis. Pt was on  keppra 500 mg po bid at home. Assessment / Plan:  UTI POA due to indwelling Snowden   Sepsis POA ( tachycardia/leukocytosis) resolved   UC: Klebsiella and pseudomonas. AB:CTX started on admission changed to Meropenem  for ESBL, total AB day should be counting from   Dr Luis Miguel Brian d/w Dr Yessy Castanon by phone: since not bacteremic and clinically improving, OK to switch to oral Cipro to finish her course - day    BC NTD   C/w  Probiotic  Holding Zoloft while on cipro ( to avoid QTc prolongation), restart       Diarrhea chronic POA  Multiple loose stools   C diff/cultures - negative  Stopped Mag oxide ( can cause diarrhea)   Check stool Na/K/fat   imodium prn now     Hypokalemia   Supplement as needed. Started on daily supplement. K 4.6 today, will go down on K from BID to daily   Will need close follow up OP     Acute Encephalopathy POA resolved likely on baseline personality disorder   DDx: post ictal vs TIA vs metabolic encephalopathy due to UTI  Seizures  Sx: patient have had a 30sec seizure like event in truck en route to hospital.  While in hospital, nursing has observed intermittent episodes of slurred speech/stattering or difficulty speaking - this is likely her baseline. Overall MS stable while in the hospital. C/w Ativan prn which helpful.   -Neurology input appreciated:continue increased dose of keppra  -CT head nothing acute. Ammonia 35. TSH 1.25  -MRI pending, ordered on admission. Having difficulty completing screen.   Currently waiting for paper work regarding her gastric pacemaker? (per nursing) - can be done as OP if bed at SNF available prior to it   -EEG showed encephalopathy and no ongoing seizure     Anemia of chronic disease   - baseline Hb 8.3, admission 7.2--> down to 6.8, s/p 1 PRBC  -per chart 09/2016 ( DC summary from PCP), pt had guaiac + stool but family declined any further w/u    -iron indices consistent with chronic disease (low iron, low TIBC, high ferritin)  -s/p 1 PRBC  -transfuse as needed if Hb < 7.0 or unstable     DM type II (HgA1c 6.6)  -BS stable   -SSI prn  -cont home Metformin    Pressure ulcers / full thickness wounds POA  Involves left heel, dorsum left foot, sacral area and right hip. Appreciate wound care input and recommendations. Please refer to their initial assessment note 1/09  for details. H/o Medical non compliance   S/P Right BKA        Code status: Full   MPOA: [id number]   Prophylaxis: heparin SQ    Baseline:pt used to live at home,   Recommended Disposition: Pt is medically stable for DC. SNF once bed available - difficult discharge   Family is pursuing LTC placement for this patient. They do not want to take her back home because they don't feel they can deal with her anymore. Patient is mean and verbally abusive to family. Patient was just discharged from [facility] court rehab last month. Patient's sister (caregiver) just recently had a stroke and a  shut placed and is in no position to help anymore. Her niece Gina Jimenes was trying to take care of pt but not able to do it anymore either. Subjective:     Chief Complaint / Reason for Physician Visit: following encephalopathy, anemia, UTI, diarrhea  MS: remain stable - confused/ uncooperative on and off  Diarrhea 3-4 loose stools a day - chronic      Discussed with RN events overnight.        Review of Systems:  Symptom Y/N Comments  Symptom Y/N Comments   Fever/Chills    Chest Pain n    Poor Appetite    Edema     Cough Abdominal Pain     Sputum    Joint Pain     SOB/BONNER n   Pruritis/Rash     Nausea/vomit n   Tolerating PT/OT y    Diarrhea    Tolerating Diet y    Constipation    Other       Could NOT obtain due to:      PO intake:   Patient Vitals for the past 72 hrs:   % Diet Eaten   01/12/17 1200 50 %     Objective:     VITALS:   Last 24hrs VS reviewed since prior progress note. Most recent are:  Patient Vitals for the past 24 hrs:   Temp Pulse Resp BP SpO2   01/15/17 0718 98 °F (36.7 °C) 84 15 138/82 100 %   01/15/17 0520 98.3 °F (36.8 °C) 85 17 127/70 100 %   01/14/17 2116 97.7 °F (36.5 °C) 94 18 127/72 100 %   01/14/17 1432 97.6 °F (36.4 °C) 96 18 112/71 100 %       Intake/Output Summary (Last 24 hours) at 01/15/17 0827  Last data filed at 01/15/17 0813   Gross per 24 hour   Intake           363.75 ml   Output              395 ml   Net           -31.25 ml        PHYSICAL EXAM:  General: WD, WN. Alert, cooperative, chronically ill appearing  EENT:  EOMI. Anicteric sclerae. MMM  Resp:  CTA bilaterally, no wheezing or rales. No accessory muscle use  CV:  Regular  rhythm,  No edema  GI:  Soft, Non distended, Non tender.  +Bowel sounds  Neurologic:  AAO x 2-3, normal speech, non focal  Extr:                R BKA  Psych:   Poor insight. Not anxious nor agitated  Skin:  (+) left heel and sacral pressure ulcers with wounds over top of left foot and right hip area.   See wound care note    Reviewed most current lab test results and cultures  YES  Reviewed most current radiology test results   YES  Review and summation of old records today    NO  Reviewed patient's current orders and MAR    YES  PMH/SH reviewed - no change compared to H&P  ________________________________________________________________________  Care Plan discussed with:    Comments   Patient x    Family      RN x    Care Manager     Consultant                        Multidiciplinary team rounds were held today with , nursing, pharmacist and clinical coordinator. Patient's plan of care was discussed; medications were reviewed and discharge planning was addressed. ________________________________________________________________________  Total NON critical care TIME:  20   Minutes    Total CRITICAL CARE TIME Spent:   Minutes non procedure based      Comments   >50% of visit spent in counseling and coordination of care x    ________________________________________________________________________  Lenin London MD     Procedures: see electronic medical records for all procedures/Xrays and details which were not copied into this note but were reviewed prior to creation of Plan. LABS:  I reviewed today's most current labs and imaging studies.   Pertinent labs include:  Recent Labs      01/15/17   0342   WBC  11.9*   HGB  8.0*   HCT  26.4*   PLT  450*     Recent Labs      01/15/17   0342  01/13/17   0430  01/12/17   1821   NA  145  143  142   K  4.6  3.2*  3.6   CL  117*  112*  112*   CO2  18*  21  21   GLU  71  130*  114*   BUN  15  10  10   CREA  0.72  0.53*  0.63   CA  7.7*  7.6*  7.8*   MG  2.1  2.1  2.2

## 2017-01-16 NOTE — PROGRESS NOTES
Oncology Nursing Communication Tool      7:11 AM  1/16/2017     Bedside shift change report given to Mercedez Chin RN (incoming nurse) by Shante West, RN (outgoing nurse) on Abhinav Metz a 60 y.o. female who was admitted on 1/8/2017  4:29 PM. Report included the following information SBAR, Kardex, ED Summary, Intake/Output, MAR and Recent Results. Significant changes during shift: none      Issues for physician to address: none            Code Status: Full Code     Infections: CRE, ESBL, MRSA     Allergies: Review of patient's allergies indicates no known allergies. Current diet: DIET DIABETIC CONSISTENT CARB Regular  DIET NUTRITIONAL SUPPLEMENTS Lunch, Dinner; Glucerna Shake  DIET NUTRITIONAL SUPPLEMENTS Breakfast; Gelatein 20       Pain Controlled [x] yes [] no   Bowel Movement [x] yes [] no   Last Bowel Movement (date) 1/16/17            Vital Signs:   Patient Vitals for the past 12 hrs:   Temp Pulse Resp BP SpO2   01/16/17 0648 98.7 °F (37.1 °C) 95 18 (!) 140/92 100 %   01/15/17 2210 98.3 °F (36.8 °C) 90 15 112/67 100 %      Intake & Output:     Intake/Output Summary (Last 24 hours) at 01/16/17 0711  Last data filed at 01/16/17 1991   Gross per 24 hour   Intake             1260 ml   Output              616 ml   Net              644 ml      Laboratory Results:     Recent Results (from the past 12 hour(s))   GLUCOSE, POC    Collection Time: 01/15/17  8:34 PM   Result Value Ref Range    Glucose (POC) 135 (H) 65 - 100 mg/dL    Performed by [address] [place] [address] for questions and clarifications were given to the incoming nurse. Patient's bed is in low position, side rails x2, door open PRN, call bell within reach and patient not in distress.       Shante West, RN

## 2017-01-16 NOTE — PROGRESS NOTES
Bedside shift change report given to MARVIN Ball (oncoming nurse). Report included pertinent events from today's shift as well as the following information SBAR, Kardex, Intake/Output, MAR and Recent Results.

## 2017-01-16 NOTE — PROGRESS NOTES
Hospitalist Progress Note    NAME: Ariana Huddleston   :  1960   MRN:  [id number]     Interim Hospital Summary: 60 y.o. female whom presented on 2017. EMS was called by family members because the patient was found on the floor covered in stool. She had have a seizure like event that lasted for 30 seconds in the truck. CT head on admission was negative. Blood work showed low Hb, UTI and leukocytosis. Pt was on  keppra 500 mg po bid at home. Assessment / Plan:  UTI POA due to indwelling Snowden   Sepsis POA ( tachycardia/leukocytosis) resolved   UC: Klebsiella and pseudomonas. AB:CTX started on admission changed to Meropenem  for ESBL, total AB day should be counting from   Dr Patrick Chu d/w Dr Clarisa Gutiérrez by phone: since not bacteremic and clinically improving, OK to switch to oral Cipro to finish her course - day    BC NTD   C/w  Probiotic  Holding Zoloft while on cipro ( to avoid QTc prolongation), restart       Diarrhea chronic POA  Multiple loose stools   C diff/cultures - negative  Stopped Mag oxide ( can cause diarrhea)   follow stool Na/K/fat - in progress   imodium prn now     Hypokalemia   Supplement as needed. Started on daily supplement. K 4.6 today, will go down on K from BID to daily   Will need close follow up OP     Acute Encephalopathy POA resolved likely on baseline personality disorder   DDx: post ictal vs TIA vs metabolic encephalopathy due to UTI  Seizures  Sx: patient have had a 30sec seizure like event in truck en route to hospital.  While in hospital, nursing has observed intermittent episodes of slurred speech/stattering or difficulty speaking - this is likely her baseline. Overall MS stable while in the hospital. C/w Ativan prn which helpful.   -Neurology input appreciated:continue increased dose of keppra  -CT head nothing acute. Ammonia 35. TSH 1.25  -MRI pending, ordered on admission. Having difficulty completing screen. Currently waiting for paper work regarding her gastric pacemaker? (per nursing) - can be done as OP if bed at SNF available prior to it   -EEG showed encephalopathy and no ongoing seizure     Anemia of chronic disease   - baseline Hb 8.3, admission 7.2--> down to 6.8, s/p 1 PRBC  -per chart 09/2016 ( DC summary from PCP), pt had guaiac + stool but family declined any further w/u    -iron indices consistent with chronic disease (low iron, low TIBC, high ferritin)  -s/p 1 PRBC  -transfuse as needed if Hb < 7.0 or unstable     DM type II (HgA1c 6.6)  -BS stable   -SSI prn  -cont home Metformin    Pressure ulcers / full thickness wounds POA  Involves left heel, dorsum left foot, sacral area and right hip. Appreciate wound care input and recommendations. Please refer to their initial assessment note 1/09  for details. H/o Medical non compliance   S/P Right BKA        Code status: Full   MPOA: Ben, [id number]   Prophylaxis: heparin SQ    Baseline:pt used to live at home,   Recommended Disposition: Pt is medically stable for DC. SNF once bed available - difficult discharge   Family is pursuing LTC placement for this patient. They do not want to take her back home because they don't feel they can deal with her anymore. Patient is mean and verbally abusive to family. Patient was just discharged from [facility] court rehab last month. Patient's sister (caregiver) just recently had a stroke and a  shut placed and is in no position to help anymore. Her niece Digna Kokhanok was trying to take care of pt but not able to do it anymore either. Subjective:     Chief Complaint / Reason for Physician Visit: following encephalopathy, anemia, UTI, diarrhea  MS: remain stable - confused/ mostly cooperative  Diarrhea: reports imodium helpful     Discussed with RN events overnight.        Review of Systems:  Symptom Y/N Comments  Symptom Y/N Comments   Fever/Chills    Chest Pain n    Poor Appetite    Edema     Cough Abdominal Pain     Sputum    Joint Pain     SOB/BONNER n   Pruritis/Rash     Nausea/vomit n   Tolerating PT/OT y    Diarrhea    Tolerating Diet y    Constipation    Other       Could NOT obtain due to:      PO intake:   Patient Vitals for the past 72 hrs:   % Diet Eaten   01/15/17 1720 40 %   01/15/17 1254 50 %   01/15/17 0948 50 %     Objective:     VITALS:   Last 24hrs VS reviewed since prior progress note. Most recent are:  Patient Vitals for the past 24 hrs:   Temp Pulse Resp BP SpO2   01/16/17 0648 98.7 °F (37.1 °C) 95 18 (!) 140/92 100 %   01/15/17 2210 98.3 °F (36.8 °C) 90 15 112/67 100 %   01/15/17 1444 98.1 °F (36.7 °C) 80 15 129/77 100 %       Intake/Output Summary (Last 24 hours) at 01/16/17 0812  Last data filed at 01/16/17 0659   Gross per 24 hour   Intake             1260 ml   Output              616 ml   Net              644 ml        PHYSICAL EXAM:  General: WD, WN. Alert, cooperative, chronically ill appearing  EENT:  EOMI. Anicteric sclerae. MMM  Resp:  CTA bilaterally, no wheezing or rales. No accessory muscle use  CV:  Regular  rhythm,  No edema  GI:  Soft, Non distended, Non tender.  +Bowel sounds  Neurologic:  AAO x 2-3, normal speech, non focal  Extr:                R BKA  Psych:   Poor insight. Not anxious nor agitated  Skin:  (+) left heel and sacral pressure ulcers with wounds over top of left foot and right hip area.   See wound care note    Reviewed most current lab test results and cultures  YES  Reviewed most current radiology test results   YES  Review and summation of old records today    NO  Reviewed patient's current orders and MAR    YES  PMH/SH reviewed - no change compared to H&P  ________________________________________________________________________  Care Plan discussed with:    Comments   Patient x    Family      RN x    Care Manager x    Consultant                        Multidiciplinary team rounds were held today with , nursing, pharmacist and clinical coordinator. Patient's plan of care was discussed; medications were reviewed and discharge planning was addressed. ________________________________________________________________________  Total NON critical care TIME:  20   Minutes    Total CRITICAL CARE TIME Spent:   Minutes non procedure based      Comments   >50% of visit spent in counseling and coordination of care x    ________________________________________________________________________  Lukas MD Dontrell     Procedures: see electronic medical records for all procedures/Xrays and details which were not copied into this note but were reviewed prior to creation of Plan. LABS:  I reviewed today's most current labs and imaging studies.   Pertinent labs include:  Recent Labs      01/15/17   0342   WBC  11.9*   HGB  8.0*   HCT  26.4*   PLT  450*     Recent Labs      01/15/17   0342   NA  145   K  4.6   CL  117*   CO2  18*   GLU  71   BUN  15   CREA  0.72   CA  7.7*   MG  2.1

## 2017-01-16 NOTE — PROGRESS NOTES
1156:  Attempted to feed pt. Stated she was not hungry will try again later  12:47 Re-attempted to feed pt lunch. Pt ate three bites.  Requested immodium and to go back to sleep

## 2017-01-16 NOTE — PROGRESS NOTES
OT orders received and chart was reviewed and per nursing pt may need assist with self feeding, reason for referral.  Prior pt was in SNF and total assist with transfers and ADLs other than feeding. Pt was not hungry today and refused to eat and will attempt to eval pt tomorrow with use of built up handles.

## 2017-01-16 NOTE — ROUTINE PROCESS
10:34 AM  1/16/2017         Laboratory called to inform RN that the stool samples that were sent overnight for  Potassium & Sodium Fecal tests are being rejected and orders being cancelled by the  Lab due to the samples not being liquid enough. Will pass on to next RN.          Russ Ng RN

## 2017-01-17 NOTE — PROGRESS NOTES
Sent referral to [facility] on Fri., 1/13. No response. -- Pt. Is not participating with PT, though they are still trying. They have to see her last each day , due to the CRE. Per Dr. Angelica Jacobs, pt. will finish her antbx. for the CRE today. I have sent another referral to [facility]  to see if they will take her as a LTC with Medicaid. She has used all her Medicare skilled days, which make it harder to place her. Will call them before the end of the day to see  If they have an answer.  CM will continue to assist .-- Satish London RN, [facility]--[phone]

## 2017-01-17 NOTE — PROGRESS NOTES
Oncology Nursing Communication Tool      6:21 AM  1/17/2017     Bedside and Verbal shift change report given to Diallo Bashir RN (incoming nurse) by Tee Conde (outgoing nurse) on Aguilar Mondragon a 60 y.o. female who was admitted on 1/8/2017  4:29 PM. Report included the following information SBAR, Kardex, Intake/Output, MAR and Recent Results. Significant changes during shift: Ativan given once during my shift. Patient was very tearful after having several very loose stools. Gave her imodium and that seems to have stopped them. Patient now quietly resting in bed with no complaints. Issues for physician to address: Blood pressure slightly high. Also, would a rectal tube be helpful d/t multiple wounds? Code Status: Full Code     Infections: CRE, ESBL, MRSA     Allergies: Review of patient's allergies indicates no known allergies. Current diet: DIET DIABETIC CONSISTENT CARB Regular  DIET NUTRITIONAL SUPPLEMENTS Lunch, Dinner; Glucerna Shake  DIET NUTRITIONAL SUPPLEMENTS Breakfast; Gelatein 20       Pain Controlled [x] yes [] no   Bowel Movement [x] yes [] no   Last Bowel Movement (date)    1/17            Vital Signs:   Patient Vitals for the past 12 hrs:   Temp Pulse Resp BP SpO2   01/17/17 0557 98 °F (36.7 °C) 93 16 (!) 168/93 100 %   01/16/17 1944 98.3 °F (36.8 °C) 93 18 (!) 173/98 100 %      Intake & Output:     Intake/Output Summary (Last 24 hours) at 01/17/17 6099  Last data filed at 01/17/17 0359   Gross per 24 hour   Intake          1741.25 ml   Output              575 ml   Net          1166.25 ml      Laboratory Results:     Recent Results (from the past 12 hour(s))   GLUCOSE, POC    Collection Time: 01/16/17  9:24 PM   Result Value Ref Range    Glucose (POC) 138 (H) 65 - 100 mg/dL    Performed by [address] [place] [address] for questions and clarifications were given to the incoming nurse.  Patient's bed is in low position, side rails x2, door open PRN, call bell within reach and patient not in distress.       Aileen Jackson

## 2017-01-17 NOTE — PROGRESS NOTES
1426 Started bed bath, linen change and dressing change, successfully completed wound care on sacrum. Patient refused further dressing changes due to pain from excoriated buttocks. Applied barrier cream to bottom and placed a cloth between her buttocks per her request. Administered tylenol. [address] Dr. Leora Chan, patient upset yelling due to pain even after receiving tylenol for pain. States she is going to kill herself due to the pain on her buttocks. Patient does not have a method, states \"I do not know I will find away. \" I informed the patient that I was going to get her a more pain medication. 1512 Informed Dr. Leora Chan of patient's pain and  informed Dr. Leora Chan that the patient states she is going to kill herself. Dr. Leora Chan states she is going to restart her home oxycodone dose.

## 2017-01-17 NOTE — PROGRESS NOTES
Problem: Self Care Deficits Care Plan (Adult)  Goal: *Acute Goals and Plan of Care (Insert Text)  1. Patient will use built up handle for utensils and plate guard for self feeding, with mod assist within 7 days. OCCUPATIONAL THERAPY EVALUATION  Patient: Sagrario Rodríguez (15 y.o. female)  Date: 1/17/2017  Primary Diagnosis: UTI (urinary tract infection)        Precautions:   Fall      ASSESSMENT :  Based on the objective data described below, the patient presents with generalized weakness, decreased endurance, strength, transfers, bed mobility, and ADLs. Pt resides in SNF and per chart pt is dependent in ADLs and is dependent in transfers. Pt is able to feed self, when she has finger foods but otherwise she is not able. Pt was issued a built up handle for her utensils and she was able to feed herself with max assist and will issue pt a plate guard to assist with scooping her food off the plate. Recommend that pt have further therapy at discharge at [facility]. .  OT to see pt pt 3x week for trial and work on self feeding and BUE ex. Patient will benefit from skilled intervention to address the above impairments.   Patients rehabilitation potential is considered to be Fair  Factors which may influence rehabilitation potential include:   [ ]             None noted  [ ]             Mental ability/status  [ ]             Medical condition  [ ]             Home/family situation and support systems  [ ]             Safety awareness  [ ]             Pain tolerance/management  [X]             Other: decreased motivation       PLAN :  Recommendations and Planned Interventions:  [X]               Self Care Training                  [X]        Therapeutic Activities  [ ]               Functional Mobility Training    [ ]        Cognitive Retraining  [ ]               Therapeutic Exercises           [ ]        Endurance Activities  [ ]               Balance Training                   [ ]        Neuromuscular Re-Education  [ ] Visual/Perceptual Training     [ ]   Home Safety Training  [X]               Patient Education                 [ ]        Family Training/Education  [ ]               Other (comment):     Frequency/Duration: Patient will be followed by occupational therapy 3 times a week to address goals. Discharge Recommendations: Skilled Nursing Facility  Further Equipment Recommendations for Discharge: tbd       SUBJECTIVE:   Patient stated I dont want to eat now.   Pop Glenn      OBJECTIVE DATA SUMMARY:   HISTORY:   Past Medical History   Diagnosis Date    Colonoscopy refused 11-16-15    DM gastroparesis ([facility]         gastric pacer    Encounter for long-term (current) use of insulin ([facility]CC)      Gastrointestinal disorder         GERD    HTN (hypertension)      Noncompliance      Orthostatic hypotension      Quadriplegia ([facility] [address] [facility]      Stroke ([facility] [address] [facility]      Type II or unspecified type diabetes mellitus with neurological manifestations, uncontrolled       Past Surgical History   Procedure Laterality Date    Hx gi           gastroenteric pacemaker    Hx above knee amputation           May 2016        Prior Level of Function/Home Situation: pt resides in a SNF and is dependent in all areas but has been able to feed self per chart  Expanded or extensive additional review of patient history:      Home Situation  Home Environment: Private residence  [address] [place] [address] Name: Kat  One/Two Story Residence: One story  Living Alone: No  Support Systems: Family member(s)  Patient Expects to be Discharged to[de-identified] Skilled nursing facility  Current DME Used/Available at Home: Transfer bench, Shower chair, Wheelchair  [X]  Right hand dominant             [ ]  Left hand dominant     EXAMINATION OF PERFORMANCE DEFICITS:  Cognitive/Behavioral Status:  Neurologic State: Alert;Confused  Orientation Level: Oriented to place;Oriented to person;Disoriented to time;Disoriented to situation  Cognition: Decreased attention/concentration;Poor safety awareness           Skin: in fair health   Edema: none  Vision/Perceptual:                 Appears intact                  Range of Motion:  Impaired in BUE            Strength:  Impaired in BUE                 Coordination:   decreased in BUE             Balance:   impaired     Functional Mobility and Transfers for ADLs:  Bed Mobility:   max assist      Transfers:   total assist     ADL Assessment:  Feeding: Moderate assistance;Minimum assistance     Oral Facial Hygiene/Grooming: Total assistance     Bathing: Total assistance     Upper Body Dressing: Total assistance     Lower Body Dressing: Total assistance         Functional Measure:        G codes: In compliance with CMSs Claims Based Outcome Reporting, the following G-code set was chosen for this patient based on their primary functional limitation being treated: The outcome measure chosen to determine the severity of the functional limitation was the Barthel with a score of 0/100 which was correlated with the impairment scale. · Self Care:               - CURRENT STATUS:    CN - 100% impaired, limited or restricted               - GOAL STATUS:           CM - 80%-99% impaired, limited or restricted               - D/C STATUS:                       ---------------To be determined---------------      Occupational Therapy Evaluation Charge Determination   History Examination Decision-Making   LOW Complexity : Brief history review  HIGH Complexity : 5 or more performance deficits relating to physical, cognitive , or psychosocial skils that result in activity limitations and / or participation restrictions HIGH Complexity : Patient presents with comorbidities that affect occupational performance.  Signifigant modification of tasks or assistance (eg, physical or verbal) with assessment (s) is necessary to enable patient to complete evaluation       Based on the above components, the patient evaluation is determined to be of the following complexity level: LOW   Pain:  Pain Scale 1: Numeric (0 - 10)  Pain Intensity 1: 1  Pain Location 1: Buttocks  Pain Orientation 1: Posterior  Pain Description 1: Burning  Pain Intervention(s) 1: Medication (see MAR); Repositioned  Activity Tolerance:   vss  Please refer to the flowsheet for vital signs taken during this treatment. After treatment:   [ ] Patient left in no apparent distress sitting up in chair  [X] Patient left in no apparent distress in bed  [X] Call bell left within reach  [X] Nursing notified  [ ] Caregiver present  [X] Bed alarm activated      COMMUNICATION/EDUCATION:   The patients plan of care was discussed with: Physical Therapist and Registered Nurse. [ ] Home safety education was provided and the patient/caregiver indicated understanding. [ ] Patient/family have participated as able in goal setting and plan of care. [ ] Patient/family agree to work toward stated goals and plan of care. [ ] Patient understands intent and goals of therapy, but is neutral about his/her participation. [X] Patient is unable to participate in goal setting and plan of care. This patients plan of care is appropriate for delegation to [facility].      Thank you for this referral.  Elle Gann, OT  Time Calculation: 20 mins

## 2017-01-17 NOTE — PROGRESS NOTES
Hospitalist Progress Note    NAME: Ivanna Benedict   :  1960   MRN:  [id number]     Interim Hospital Summary: 60 y.o. female whom presented on 2017. EMS was called by family members because the patient was found on the floor covered in stool. She had have a seizure like event that lasted for 30 seconds in the truck. CT head on admission was negative. Blood work showed low Hb, UTI and leukocytosis. Pt was on  keppra 500 mg po bid at home. Assessment / Plan:  Elevated BP without h/o HTN   BP usually < 120, this am 140/170  Monitor for no     UTI POA due to indwelling Snowden   Sepsis POA ( tachycardia/leukocytosis) resolved   UC: Klebsiella and pseudomonas. AB:CTX started on admission changed to Meropenem  for ESBL, total AB day should be counting from   Dr Primo Flores d/w Dr Patricia Alexander by phone: since not bacteremic and clinically improving, OK to switch to oral Cipro to finish her course - day    BC NTD   C/w  Probiotic  Holding Zoloft while on cipro ( to avoid QTc prolongation), restart     Snowden changed      Diarrhea chronic POA  Multiple loose stools   C diff/cultures - negative  Stopped Mag oxide ( can cause diarrhea)   follow stool Na/K/fat - in progress   imodium prn now effective     Hypokalemia   Supplement as needed. Started on daily supplement. K 4.6 today, will go down on K from BID to daily   Will need close follow up OP     Acute Encephalopathy POA resolved likely on baseline personality disorder   DDx: post ictal vs TIA vs metabolic encephalopathy due to UTI  Seizures  Sx: patient have had a 30sec seizure like event in truck en route to hospital.  While in hospital, nursing has observed intermittent episodes of slurred speech/stattering or difficulty speaking - this is likely her baseline.  Overall MS stable while in the hospital. C/w Ativan prn which helpful.   -Neurology input appreciated:continue increased dose of keppra  -CT head nothing acute. Ammonia 35. TSH 1.25  -MRI pending, ordered on admission. Having difficulty completing screen. Currently waiting for paper work regarding her gastric pacemaker? (per nursing) - can be done as OP if bed at SNF available prior to it   -EEG showed encephalopathy and no ongoing seizure     Anemia of chronic disease   - baseline Hb 8.3, admission 7.2--> down to 6.8, s/p 1 PRBC  -per chart 09/2016 ( DC summary from PCP), pt had guaiac + stool but family declined any further w/u    -iron indices consistent with chronic disease (low iron, low TIBC, high ferritin)  -s/p 1 PRBC  -transfuse as needed if Hb < 7.0 or unstable     DM type II (HgA1c 6.6)  -BS stable   -SSI prn  -cont home Metformin    Pressure ulcers / full thickness wounds POA  Involves left heel, dorsum left foot, sacral area and right hip. Appreciate wound care input and recommendations. Please refer to their initial assessment note 1/09  for details. H/o Medical non compliance   S/P Right BKA        Code status: Full   MPOA: FRANCE [id number]   Prophylaxis: heparin SQ    Baseline:pt used to live at home,   Recommended Disposition: Pt is medically stable for DC. SNF once bed available - difficult discharge ( SNF is not accepting due to CRE)   Family is pursuing LTC placement for this patient. They do not want to take her back home because they don't feel they can deal with her anymore. Patient is mean and verbally abusive to family. Patient was just discharged from [facility] court rehab last month. Patient's sister (caregiver) just recently had a stroke and a  shut placed and is in no position to help anymore. Her niece Negra Mask was trying to take care of pt but not able to do it anymore either.       Subjective:     Chief Complaint / Reason for Physician Visit: following encephalopathy, anemia, UTI, diarrhea  MS: remain stable - confused/ mostly cooperative  Diarrhea: reports imodium helpful   Had episode of anxiety last night and got ativan - effective     Discussed with RN events overnight. Review of Systems:  Symptom Y/N Comments  Symptom Y/N Comments   Fever/Chills n   Chest Pain n    Poor Appetite n   Edema     Cough    Abdominal Pain n    Sputum    Joint Pain     SOB/BONNER n   Pruritis/Rash     Nausea/vomit n   Tolerating PT/OT y    Diarrhea y Chronic   Tolerating Diet y    Constipation n   Other       Could NOT obtain due to:      PO intake:   Patient Vitals for the past 72 hrs:   % Diet Eaten   01/16/17 2305 80 %   01/16/17 0901 75 %   01/15/17 1720 40 %   01/15/17 1254 50 %   01/15/17 0948 50 %     Objective:     VITALS:   Last 24hrs VS reviewed since prior progress note. Most recent are:  Patient Vitals for the past 24 hrs:   Temp Pulse Resp BP SpO2   01/17/17 0557 98 °F (36.7 °C) 93 16 (!) 168/93 100 %   01/16/17 1944 98.3 °F (36.8 °C) 93 18 (!) 173/98 100 %   01/16/17 1409 98.2 °F (36.8 °C) 92 16 151/83 100 %       Intake/Output Summary (Last 24 hours) at 01/17/17 0815  Last data filed at 01/17/17 0600   Gross per 24 hour   Intake           592.75 ml   Output              475 ml   Net           117.75 ml        PHYSICAL EXAM:  General: WD, WN. Alert, cooperative, chronically ill appearing  EENT:  EOMI. Anicteric sclerae. MMM  Resp:  CTA bilaterally, no wheezing or rales. No accessory muscle use  CV:  Regular  rhythm,  No edema  GI:  Soft, Non distended, Non tender.  +Bowel sounds  Neurologic:  AAO x 2-3, normal speech, non focal  Extr:                R BKA  Psych:   Poor insight. Not anxious nor agitated  Skin:  (+) left heel and sacral pressure ulcers with wounds over top of left foot and right hip area.   See wound care note    Reviewed most current lab test results and cultures  YES  Reviewed most current radiology test results   YES  Review and summation of old records today    NO  Reviewed patient's current orders and MAR    YES  PMH/SH reviewed - no change compared to H&P  ________________________________________________________________________  Care Plan discussed with:    Comments   Patient x    Family      RN x    Care Manager x    Consultant                        Multidiciplinary team rounds were held today with , nursing, pharmacist and clinical coordinator. Patient's plan of care was discussed; medications were reviewed and discharge planning was addressed. ________________________________________________________________________  Total NON critical care TIME:  20   Minutes    Total CRITICAL CARE TIME Spent:   Minutes non procedure based      Comments   >50% of visit spent in counseling and coordination of care x    ________________________________________________________________________  Tigist Harris MD     Procedures: see electronic medical records for all procedures/Xrays and details which were not copied into this note but were reviewed prior to creation of Plan. LABS:  I reviewed today's most current labs and imaging studies.   Pertinent labs include:  Recent Labs      01/15/17   0342   WBC  11.9*   HGB  8.0*   HCT  26.4*   PLT  450*     Recent Labs      01/15/17   0342   NA  145   K  4.6   CL  117*   CO2  18*   GLU  71   BUN  15   CREA  0.72   CA  7.7*   MG  2.1

## 2017-01-18 NOTE — PROGRESS NOTES
1526: Pt agitated, yelling and screaming when touched, pt refusing to take medications and not being cooperative. Will continue to assess.      1700: Pt refusing to be changed & turned

## 2017-01-18 NOTE — PROGRESS NOTES
Brief Nutrition Note    Chart reviewed, medically noted for chronic diarrhea, encephalopathy, seizures, DM, pressure ulcers, medical non-compliance. Per RN pt currently refusing meals and meds, however was eating ok prior to this. Will follow-up to see if intake improves.      Malou Prasad RDN  Pager: [phone]

## 2017-01-18 NOTE — PROGRESS NOTES
Report received from Jose Alberto Boothe [facility]. SBAR and Kardex were discussed. Kierra Green RN      8:57 AM HYPOGLYCEMIC EPISODE DOCUMENTATION    Patient with hypoglycemic episode(s) at 0757(time) on 1/18/2017 (date). BG value(s) pre-treatment 72, 66, 78    Was patient symptomatic? [] yes, [x] no  Patient was treated with the following rescue medications/treatments: [] D50                [] Glucose tablets                [] Glucagon                [x] 4oz juice                [] 6oz reg soda                [] 8oz low fat milk  BG value post-treatment: 85  Once BG treated and value greater than 80mg/dl, pt was provided with the following:  [] snack  [x] meal  Name of MD notified:Dr. Faby Emery upon rounding  The following orders were received:      12:34 PM wound care completed     12:59 PM pt with large BM; incontinence care provided. 2:48 PM patient had great morning, pain well controlled and very interactive and pleasant. Pt ate breakfast and lunch well with assist. After large BM pt required PRN dose of Ativan due to agitation. Pt still agitated and refusing to have bath and sheets changed. Patient did have partial bath and gown change after BM. Pt also refusing iron pill and heparin shot. MD paged to update no orders received. OK to give Metformin this evening. 2:55 PM bedside SBAR given to [employer]ive[employer]VIN.

## 2017-01-18 NOTE — PROGRESS NOTES
Bedside and Verbal shift change report given to Gladys Bower (oncoming nurse) by Mirtha Gonzáles RN (offgoing nurse). Report included the following information SBAR, Kardex, ED Summary, OR Summary, Procedure Summary, Intake/Output, MAR, Accordion, Recent Results and Med Rec Status.

## 2017-01-18 NOTE — PROGRESS NOTES
Attempted to see pt for PT tx. Discussed with nursing Sunni Gould) who stated that pt was agreeable to work with therapy in the morning, but is now agitated and refusing medication and therapy. Will continue to follow for PT. She continues to have CRE and needs to be the last pt seen, which is difficult due to behavior in the afternoon and refusal of therapy at that time. Thank you.   Danielle Man, PT, DPT

## 2017-01-18 NOTE — PROGRESS NOTES
Oncology Nursing Communication Tool      7:57 AM  1/18/2017     Bedside shift change report given to Namrata Chen RN (incoming nurse) by Dmitriy Ennis RN (outgoing nurse) on Jluissa Santiago a 60 y.o. female who was admitted on 1/8/2017  4:29 PM. Report included the following information SBAR, Kardex, ED Summary, Procedure Summary, Intake/Output, MAR and Recent Results. Significant changes during shift: Patient had one large stool, patient also had pain especially with turning and cleaning after BM. Given Roxicodone every 4 hours as patient has been in a lot of pain. Issues for physician to address:  None          Code Status: Full Code     Infections: CRE, ESBL, MRSA     Allergies: Review of patient's allergies indicates no known allergies. Current diet: DIET DIABETIC CONSISTENT CARB Regular  DIET NUTRITIONAL SUPPLEMENTS Lunch, Dinner; Glucerna Shake  DIET NUTRITIONAL SUPPLEMENTS Breakfast; Gelatein 20       Pain Controlled [x] yes [] no   Bowel Movement  yes [] no   Last Bowel Movement (date)     1/17/2017            Vital Signs:   Patient Vitals for the past 12 hrs:   Temp Pulse Resp BP SpO2   01/18/17 0545 98.3 °F (36.8 °C) 87 - 142/89 100 %   01/17/17 2237 98.5 °F (36.9 °C) 90 16 155/83 100 %      Intake & Output:     Intake/Output Summary (Last 24 hours) at 01/18/17 0757  Last data filed at 01/18/17 0540   Gross per 24 hour   Intake                0 ml   Output              850 ml   Net             -850 ml      Laboratory Results:     Recent Results (from the past 12 hour(s))   GLUCOSE, POC    Collection Time: 01/17/17 10:32 PM   Result Value Ref Range    Glucose (POC) 126 (H) 65 - 100 mg/dL    Performed by Amy Bertrand               Opportunity for questions and clarifications were given to the incoming nurse. Patient's bed is in low position, side rails x2, door open PRN, call bell within reach and patient not in distress.       Dmitriy Ennis, MARVIN

## 2017-01-19 NOTE — PROGRESS NOTES
Hospitalist Progress Note    NAME: Roderick Youssef   :  1960   MRN:  [id number]     Interim Hospital Summary: 60 y.o. female whom presented on 2017. EMS was called by family members because the patient was found on the floor covered in stool. She had have a seizure like event that lasted for 30 seconds in the truck. CT head on admission was negative. Blood work showed low Hb, UTI and leukocytosis. Pt was on  keppra 500 mg po bid at home. Assessment / Plan:    Sepsis due to Complicated UTI POA in the setting of indwelling Snowden POA  UC: Klebsiella (CRE) and pseudomonas. AB:CTX started on admission changed to Meropenem  for ESBL and later switched to oral cipro. Completed course of cipro  on   C/w  Probiotic  Snowden changed      Diarrhea chronic POA  Multiple loose stools   C diff/cultures - negative  imodium prn now effective     Acute Encephalopathy POA resolved likely on baseline personality disorder   DDx: post ictal vs TIA vs metabolic encephalopathy due to UTI    Seizures  Sx: patient have had a 30sec seizure like event in truck en route to hospital.  While in hospital, nursing has observed intermittent episodes of slurred speech/stattering or difficulty speaking - this is likely her baseline. Overall MS stable while in the hospital. C/w Ativan prn which helpful.   -Neurology input appreciated:continue increased dose of keppra  -CT head nothing acute. Ammonia 35. TSH 1.25  -EEG showed encephalopathy and no ongoing seizure     Anemia of chronic disease   -per chart 2016 ( DC summary from PCP), pt had guaiac + stool but family declined any further w/u    -iron indices consistent with chronic disease (low iron, low TIBC, high ferritin)  -s/p 1 PRBC, Transfuse as needed if Hb < 7.0 or unstable     DM type II (HgA1c 6.6)  -SSI prn  -stop metformin as BG running low normal range.       Pressure ulcers / full thickness wounds POA  Involves left heel, dorsum left foot, sacral area and right hip. Appreciate wound care input and recommendations. Please refer to their initial assessment note 1/09  for details. H/o Medical non compliance   S/P Right BKA        Code status: Full   MPOA: Niece, [id number]   Prophylaxis: heparin SQ    Baseline:pt used to live at home,   Recommended Disposition: Pt is medically stable for DC. SNF once bed available - difficult discharge ( SNF is not accepting due to CRE)   Family is pursuing LTC placement for this patient. They do not want to take her back home because they don't feel they can deal with her anymore. Patient is mean and verbally abusive to family. Patient was just discharged from [facility] court rehab last month. Patient's sister (caregiver) just recently had a stroke and a  shut placed and is in no position to help anymore. Her niece Angela Zuniga was trying to take care of pt but not able to do it anymore either. Subjective:     Chief Complaint / Reason for Physician Visit: following encephalopathy, anemia, UTI, diarrhea  BG dips down to the low 60s. Review of Systems:  Symptom Y/N Comments  Symptom Y/N Comments   Fever/Chills n   Chest Pain n    Poor Appetite n   Edema     Cough    Abdominal Pain n    Sputum    Joint Pain     SOB/BONNER n   Pruritis/Rash     Nausea/vomit n   Tolerating PT/OT y    Diarrhea y Chronic   Tolerating Diet y    Constipation n   Other       Could NOT obtain due to:      PO intake:   Patient Vitals for the past 72 hrs:   % Diet Eaten   01/18/17 1215 75 %   01/18/17 1211 75 %   01/18/17 1030 100 %   01/18/17 0830 75 %   01/16/17 2305 80 %     Objective:     VITALS:   Last 24hrs VS reviewed since prior progress note.  Most recent are:  Patient Vitals for the past 24 hrs:   Temp Pulse Resp BP SpO2   01/19/17 0730 98.8 °F (37.1 °C) 91 18 125/74 99 %   01/19/17 0407 97.9 °F (36.6 °C) 62 18 (!) 162/91 92 %   01/18/17 2208 99.2 °F (37.3 °C) 93 18 (!) 155/94 98 %       Intake/Output Summary (Last 24 hours) at 01/19/17 1636  Last data filed at 01/19/17 1514   Gross per 24 hour   Intake                0 ml   Output              625 ml   Net             -625 ml        PHYSICAL EXAM:  General: WD, WN. Alert, cooperative, chronically ill appearing  EENT:  EOMI. Anicteric sclerae. MMM  Resp:  CTA bilaterally, no wheezing or rales. No accessory muscle use  CV:  Regular  rhythm,  No edema  GI:  Soft, Non distended, Non tender.  +Bowel sounds  Neurologic:  AAO x 2-3, normal speech, non focal  Extr:                R BKA  Psych:   Poor insight. Not anxious nor agitated  Skin:  (+) left heel and sacral pressure ulcers with wounds over top of left foot and right hip area. See wound care note    Reviewed most current lab test results and cultures  YES  Reviewed most current radiology test results   YES  Review and summation of old records today    NO  Reviewed patient's current orders and MAR    YES  PMH/SH reviewed - no change compared to H&P  ________________________________________________________________________  Care Plan discussed with:    Comments   Patient x    Family      RN x    Care Manager x    Consultant                        Multidiciplinary team rounds were held today with , nursing, pharmacist and clinical coordinator. Patient's plan of care was discussed; medications were reviewed and discharge planning was addressed. ________________________________________________________________________  Total NON critical care TIME:  20   Minutes    Total CRITICAL CARE TIME Spent:   Minutes non procedure based      Comments   >50% of visit spent in counseling and coordination of care x    ________________________________________________________________________  Shima Chau MD     Procedures: see electronic medical records for all procedures/Xrays and details which were not copied into this note but were reviewed prior to creation of Plan. LABS:  I reviewed today's most current labs and imaging studies. Pertinent labs include:  No results for input(s): WBC, HGB, HCT, PLT, HGBEXT, HCTEXT, PLTEXT, HGBEXT, HCTEXT, PLTEXT in the last 72 hours. No results for input(s): NA, K, CL, CO2, GLU, BUN, CREA, CA, MG, PHOS, ALB, TBIL, TBILI, SGOT, ALT, INR in the last 72 hours.     No lab exists for component: INREXT, INREXT

## 2017-01-19 NOTE — PROGRESS NOTES
OT spoke with nursing and pt has refused to feed herself at all and will not even try to use the built up handle with nursing. Pt is yelling and screaming this am, and able to hear pt in the hallway and she is not motivated to work on self feeding and was yelling out to nurse that she was hungry and was waiting for nursing to feed her. Pt is not appropriate for OT tx at this time and will discharge pt from OT services and re consult OT when pt is more appropriate.

## 2017-01-19 NOTE — PROGRESS NOTES
[facility] has no bed for pt. I am trying [facility] now. Will follow. Brett KINSEY,PORFIRIO,[facility]--[phone]

## 2017-01-19 NOTE — PROGRESS NOTES
Report received from Alee Tidwell, [address] [place] [address]. SBAR and Kardex were discussed. Guanako Rodriguez, RN     3:13 PM pt ordered dinner. No c/o at this time. Assessment completed. 4:00 hourly rounding completed, patient given snack per request. PT into work with patient.     5:02 PM Hourly rounding completed. Pt refusing to have R foot wound care completed at this time. Encouraged patient to take pain medication but very clear that she will not allow this write to complete wound care. Patient with good attempt at feeding self but has poor  and weakness of upper extremities. Will monitor. 6:35 PM hourly rounding completed. Pt resting with eyes closed, states she has no needs. 6:45 PM pt pleasant during shift except when attempting to do wound care and Heparin injection, also cont to refuse iron. 7:27 PM bedside report given to Gladys Bower, [address] [place] [address].

## 2017-01-19 NOTE — PROGRESS NOTES
Problem: Mobility Impaired (Adult and Pediatric)  Goal: *Acute Goals and Plan of Care (Insert Text)  Physical Therapy Goals  Revised 1/19/2017  1. Patient will move from supine to sit and sit to supine in bed with modified independent to the L and Sri to the R within 7 day(s). 2. Patient will transfer from bed to chair and chair to bed with maximal assistance x 2 using the least restrictive device within 7 day(s). 3. Patient will sit EOB for dynamic task for ~5 mins with CGA-Sri within 7 days. 4. Patient will sit EOB with CGA for static sitting within 7 days. Physical Therapy Goals  Initiated 1/11/2017  1. Patient will move from supine to sit and sit to supine , scoot up and down and roll side to side in bed with minimal assistance/contact guard assist within 7 day(s). 2. Patient will transfer from bed to chair and chair to bed with maximal assistance using the least restrictive device within 7 day(s). 3. Patient will sit on EOB ~ 5 minutes demonstrating good sitting balance within 7 days. PHYSICAL THERAPY TREATMENT: WEEKLY REASSESSMENT  Patient: Murlene Apley (78 y.o. female)  Date: 1/19/2017  Diagnosis: UTI (urinary tract infection) <principal problem not specified>       Precautions: Fall      ASSESSMENT:  Patient making slow progress toward goals. Has only been seen a few times by PT secondary to refusal, agitation, confusion and off the floor. Earlier today patient heard yelling out but this afternoon is pleasant and agreeable to PT. Supine to sit with maxA x 1. Poor sitting balance initially on EOB but improves to fair-poor (CGA-modA) with foot on the floor. No active movement noted to L foot/ankle with significant muscle atrophy to lower leg and necrotic heel. Instructed in supine exercises and does well but needs some assist on the L LE. Rolls R with supervision using bed rail and modA to the L. Not safe for OOB transfers at this time but will likely require sliding board for safety. Patient repositioned in bed with needs in reach. Will continue to follow but recommend return to SNF setting. Patient's progression toward goals since last assessment: minimal progress toward goals. Goals adjusted       PLAN:  Goals have been updated based on progression since last assessment. Patient continues to benefit from skilled intervention to address the above impairments. Continue to follow the patient 3 times a week to address goals. Planned Interventions:  [X]              Bed Mobility Training             [ ]       Neuromuscular Re-Education  [X]              Transfer Training                   [ ]       Orthotic/Prosthetic Training  [X]              Gait Training                         [ ]       Modalities  [X]              Therapeutic Exercises           [ ]       Edema Management/Control  [X]              Therapeutic Activities            [X]       Patient and Family Training/Education  [ ]              Other (comment):  Discharge Recommendations: Skilled Nursing Facility  Further Equipment Recommendations for Discharge: TBD       SUBJECTIVE:   Patient stated I like you guys because we are actually doing work.       OBJECTIVE DATA SUMMARY:   Critical Behavior:  Neurologic State: Alert (periods of agitation)  Orientation Level: Disoriented to person, Disoriented to place, Disoriented to situation  Cognition: Impaired decision making        Strength:    generally decreased                        Functional Mobility Training:  Bed Mobility:  Rolling: Supervision (S to the R, modA to the L)  Supine to Sit: Maximum assistance; Additional time;Assist x1  Sit to Supine: Maximum assistance;Assist x2  Scooting: Maximum assistance        Transfers:      not tested                             Balance:  Sitting: Impaired  Sitting - Static: Poor (constant support)  Sitting - Dynamic: Poor (constant support)     Neuro Re-Education:  Worked on trunk control EOB  Therapeutic Exercises:   Glut sets  Hip abd with assist  Quad sets  Pain:  Pain Scale 1: Numeric (0 - 10)  Pain Intensity 1: 1              Activity Tolerance:   VSS  Please refer to the flowsheet for vital signs taken during this treatment.   After treatment:   [ ]  Patient left in no apparent distress sitting up in chair  [X]  Patient left in no apparent distress in bed  [X]  Call bell left within reach  [X]  Nursing notified  [ ]  Caregiver present  [ ]  Bed alarm activated      COMMUNICATION/COLLABORATION:   The patients plan of care was discussed with: Physical Therapist and Registered Nurse     Rafael Hernández PT, DPT   Time Calculation: 20 mins

## 2017-01-19 NOTE — PROGRESS NOTES
Hospitalist Progress Note    NAME: Cat Simeon   :  1960   MRN:  [id number]     Interim Hospital Summary: 60 y.o. female whom presented on 2017. EMS was called by family members because the patient was found on the floor covered in stool. She had have a seizure like event that lasted for 30 seconds in the truck. CT head on admission was negative. Blood work showed low Hb, UTI and leukocytosis. Pt was on  keppra 500 mg po bid at home. Assessment / Plan:    Sepsis due to Complicated UTI POA in the setting of indwelling Snowden POA  UC: Klebsiella (CRE) and pseudomonas. AB:CTX started on admission changed to Meropenem  for ESBL and later switched to oral cipro. Completed course of cipro  on   C/w  Probiotic  Snowden changed      Diarrhea chronic POA  Multiple loose stools   C diff/cultures - negative  imodium prn now effective     Acute Encephalopathy POA resolved likely on baseline personality disorder   DDx: post ictal vs TIA vs metabolic encephalopathy due to UTI    Seizures  Sx: patient have had a 30sec seizure like event in truck en route to hospital.  While in hospital, nursing has observed intermittent episodes of slurred speech/stattering or difficulty speaking - this is likely her baseline. Overall MS stable while in the hospital. C/w Ativan prn which helpful.   -Neurology input appreciated:continue increased dose of keppra  -CT head nothing acute. Ammonia 35.   TSH 1.25  -EEG showed encephalopathy and no ongoing seizure     Anemia of chronic disease   -per chart 2016 ( DC summary from PCP), pt had guaiac + stool but family declined any further w/u    -iron indices consistent with chronic disease (low iron, low TIBC, high ferritin)  -s/p 1 PRBC, Transfuse as needed if Hb < 7.0 or unstable     DM type II (HgA1c 6.6)  -SSI prn  -cont home Metformin    Pressure ulcers / full thickness wounds POA  Involves left heel, dorsum left foot, sacral area and right hip. Appreciate wound care input and recommendations. Please refer to their initial assessment note 1/09  for details. H/o Medical non compliance   S/P Right BKA        Code status: Full   MPOA: KINGA [id number]   Prophylaxis: heparin SQ    Baseline:pt used to live at home,   Recommended Disposition: Pt is medically stable for DC. SNF once bed available - difficult discharge ( SNF is not accepting due to CRE)   Family is pursuing LTC placement for this patient. They do not want to take her back home because they don't feel they can deal with her anymore. Patient is mean and verbally abusive to family. Patient was just discharged from [facility] court rehab last month. Patient's sister (caregiver) just recently had a stroke and a  shut placed and is in no position to help anymore. Her niece Florence Carney was trying to take care of pt but not able to do it anymore either. Subjective:     Chief Complaint / Reason for Physician Visit: following encephalopathy, anemia, UTI, diarrhea  No new issues. Review of Systems:  Symptom Y/N Comments  Symptom Y/N Comments   Fever/Chills n   Chest Pain n    Poor Appetite n   Edema     Cough    Abdominal Pain n    Sputum    Joint Pain     SOB/BONNER n   Pruritis/Rash     Nausea/vomit n   Tolerating PT/OT y    Diarrhea y Chronic   Tolerating Diet y    Constipation n   Other       Could NOT obtain due to:      PO intake:   Patient Vitals for the past 72 hrs:   % Diet Eaten   01/18/17 1215 75 %   01/18/17 1211 75 %   01/18/17 1030 100 %   01/18/17 0830 75 %   01/16/17 2305 80 %   01/16/17 0901 75 %     Objective:     VITALS:   Last 24hrs VS reviewed since prior progress note.  Most recent are:  Patient Vitals for the past 24 hrs:   Temp Pulse Resp BP SpO2   01/18/17 1437 98.7 °F (37.1 °C) 93 16 141/81 99 %   01/18/17 0545 98.3 °F (36.8 °C) 87 - 142/89 100 %   01/17/17 2237 98.5 °F (36.9 °C) 90 16 155/83 100 %       Intake/Output Summary (Last 24 hours) at 01/18/17 2046  Last data filed at 01/18/17 1504   Gross per 24 hour   Intake              550 ml   Output             1350 ml   Net             -800 ml        PHYSICAL EXAM:  General: WD, WN. Alert, cooperative, chronically ill appearing  EENT:  EOMI. Anicteric sclerae. MMM  Resp:  CTA bilaterally, no wheezing or rales. No accessory muscle use  CV:  Regular  rhythm,  No edema  GI:  Soft, Non distended, Non tender.  +Bowel sounds  Neurologic:  AAO x 2-3, normal speech, non focal  Extr:                R BKA  Psych:   Poor insight. Not anxious nor agitated  Skin:  (+) left heel and sacral pressure ulcers with wounds over top of left foot and right hip area. See wound care note    Reviewed most current lab test results and cultures  YES  Reviewed most current radiology test results   YES  Review and summation of old records today    NO  Reviewed patient's current orders and MAR    YES  PMH/ reviewed - no change compared to H&P  ________________________________________________________________________  Care Plan discussed with:    Comments   Patient x    Family      RN x    Care Manager x    Consultant                        Multidiciplinary team rounds were held today with , nursing, pharmacist and clinical coordinator. Patient's plan of care was discussed; medications were reviewed and discharge planning was addressed. ________________________________________________________________________  Total NON critical care TIME:  20   Minutes    Total CRITICAL CARE TIME Spent:   Minutes non procedure based      Comments   >50% of visit spent in counseling and coordination of care x    ________________________________________________________________________  Shukri Putt, MD     Procedures: see electronic medical records for all procedures/Xrays and details which were not copied into this note but were reviewed prior to creation of Plan.       LABS:  I reviewed today's most current labs and imaging studies. Pertinent labs include:  No results for input(s): WBC, HGB, HCT, PLT, HGBEXT, HCTEXT, PLTEXT, HGBEXT, HCTEXT, PLTEXT in the last 72 hours. No results for input(s): NA, K, CL, CO2, GLU, BUN, CREA, CA, MG, PHOS, ALB, TBIL, TBILI, SGOT, ALT, INR in the last 72 hours.     No lab exists for component: INREXT, INREXT

## 2017-01-20 NOTE — PROGRESS NOTES
Nutrition Assessment:    INTERVENTIONS/RECOMMENDATIONS:   Continue current diet    ASSESSMENT:   Chart reviewed, medically noted for chronic diarrhea, encephalopathy, seizures, DM, pressure ulcers, medical non-compliance. Per RN, pt is eating well % of meals. Diet Order: Consistent carb (Glucerna (L&D) Gelatien (B))  % Eaten:  Patient Vitals for the past 72 hrs:   % Diet Eaten   01/20/17 0808 75 %   01/19/17 1717 75 %   01/18/17 1215 75 %   01/18/17 1211 75 %   01/18/17 1030 100 %   01/18/17 0830 75 %     Pertinent Medications: [x] Reviewed []Other: (pepcid, Fe, SSI, lactobac, imodium, KCl)  Pertinent Labs: [x]Reviewed  []Other:  Food Allergies: [x]None []Other:     Last BM: 1/19   []Active     []Hyperactive  []Hypoactive       [] Absent  BS  Skin:    [] Intact   [] Incision  [x] Breakdown   []Edema   []Other:    Anthropometrics: Height: 5' 5\" (165.1 cm) Weight: 54.4 kg (120 lb)    IBW (%IBW): 56.7 kg (125 lb) ( ) UBW (%UBW):   (  %)    BMI: Body mass index is 19.97 kg/(m^2). This BMI is indicative of:  []Underweight   []Normal   []Overweight   [] Obesity   [] Extreme Obesity (BMI>40)  Last Weight Metrics:  Weight Loss Metrics 1/18/2017 12/16/2016 10/4/2016 7/26/2016 7/8/2016 5/3/2016 4/23/2016   Today's Wt 120 lb 130 lb 141 lb 169 lb 1.5 oz 148 lb 9.4 oz 143 lb 143 lb   BMI 19.97 kg/m2 21.63 kg/m2 23.46 kg/m2 26.48 kg/m2 23.99 kg/m2 23.09 kg/m2 22.39 kg/m2       Estimated Nutrition Needs (Based on): 1772 Kcals/day (BMR (1171) x 1.3AF +250kcal for wound healing) , 89 g (-94g (1.5-1.6 g/kg bw)) Protein  Carbohydrate:  At Least 130 g/day  Fluids: 1772 mL/day     Pt expected to meet estimated nutrient needs: [x]Yes []No    NUTRITION DIAGNOSES:   Problem:  Increased protein needs      Etiology: related to wound healing     Signs/Symptoms: as evidenced by unstageable left heel, stage 4 sacrum       NUTRITION INTERVENTIONS:  Meals/Snacks: General/healthful diet   Supplements: Commercial supplement GOAL:   consume >75% of meals and ONS in 5-7 days    NUTRITION MONITORING AND EVALUATION   Behavioral-Environmental Outcomes: Behavior  Food/Nutrient Intake Outcomes:  Total energy intake, Protein intake  Physical Signs/Symptoms Outcomes: Weight/weight change, Electrolyte and renal profile, Glucose profile    Previous Goal Met:   [x] Met              [] Progressing Towards Goal              [] Not Progressing Towards Goal   Previous Recommendations:   [x] Implemented          [] Not Implemented          [] Not Applicable    LEARNING NEEDS (Diet, Food/Nutrient-Drug Interaction):    [x] None Identified   [] Identified and Education Provided/Documented   [] Identified and Pt declined/was not appropriate     Cultural, Voodoo, OR Ethnic Dietary Needs:    [x] None Identified   [] Identified and Addressed     [x] Interdisciplinary Care Plan Reviewed/Documented    [x] Discharge Planning: consistent carb diet   [] Participated in Interdisciplinary Rounds    NUTRITION RISK:    [] High              [] Moderate           [x]  Low  []  Minimal/Uncompromised      Db Richards RDN  Pager [phone]  Weekend Pager [phone]

## 2017-01-20 NOTE — PROGRESS NOTES
[facility] Is willing to accept pt. For LTC. I will do a UAI for them and send ASAP. I spoke with Gina Jimenes, niece that has been care-giver, and let her know that pt. Is discharged today. She will need to go and sign pt. In today or tomorrow. Niece was concerned about pt's . outstanding bills and having to give her SS check to [facility]. I explained that they are providing 24hr. Care and meds. for pt. I told her that there is a SW there that could help her. I gave her the address and phone no. for [facility]. Amb. Set up for 230pm. --Micah Pruett, EG,[phone]    Care Management Interventions  PCP Verified by CM:  Yes  Mode of Transport at Discharge: BLS  Transition of Care Consult (CM Consult): SNF  MyChart Signup: No  Discharge Durable Medical Equipment: No  Health Maintenance Reviewed: Yes  Physical Therapy Consult: Yes  Occupational Therapy Consult: No  Speech Therapy Consult: No  Current Support Network: Lives with Caregiver, Other  Confirm Follow Up Transport: Other (see comment)  Plan discussed with Pt/Family/Caregiver: Yes (caregiver- Halima)  Freedom of Choice Offered: Yes  Discharge Location  Discharge Placement: Skilled nursing facility [facility])

## 2017-01-20 NOTE — PROGRESS NOTES
Oncology Nursing Communication Tool      7:20 AM  1/20/2017     Bedside shift change report given to Austin Mccabe, RN (incoming nurse) by Jh Rawls RN (outgoing nurse) on Nohelia Real a 60 y.o. female who was admitted on 1/8/2017  4:29 PM. Report included the following information SBAR, Kardex, Procedure Summary, Intake/Output, MAR and Recent Results. Significant changes during shift: Patient had one bowel mvt, Pain medication given      Issues for physician to address: None          Code Status: Full Code     Infections: CRE, ESBL, MRSA     Allergies: Review of patient's allergies indicates no known allergies. Current diet: DIET DIABETIC CONSISTENT CARB Regular; Disposable Dishes       Pain Controlled [x] yes [] no   Bowel Movement [x] yes [] no   Last Bowel Movement (date)     1/19/2017            Vital Signs:   Patient Vitals for the past 12 hrs:   Temp Pulse Resp BP SpO2   01/20/17 0513 98.5 °F (36.9 °C) 100 18 131/72 99 %   01/19/17 2118 97.9 °F (36.6 °C) 89 18 141/72 100 %      Intake & Output:     Intake/Output Summary (Last 24 hours) at 01/20/17 0720  Last data filed at 01/19/17 1717   Gross per 24 hour   Intake              450 ml   Output              450 ml   Net                0 ml      Laboratory Results:     Recent Results (from the past 12 hour(s))   GLUCOSE, POC    Collection Time: 01/19/17 10:21 PM   Result Value Ref Range    Glucose (POC) 192 (H) 65 - 100 mg/dL    Performed by Ashlie Edward               Opportunity for questions and clarifications were given to the incoming nurse. Patient's bed is in low position, side rails x2, door open PRN, call bell within reach and patient not in distress.       Jh Rawls, RN

## 2017-01-20 NOTE — DISCHARGE SUMMARY
Hospitalist Discharge Summary     Patient ID:  Mackenzie Jones  [id number]  60 y.o.  1960    PCP on record: Michael Newby MD    Admit date: 1/8/2017  Discharge date and time: 1/20/2017      DISCHARGE DIAGNOSIS:    Sepsis due to Complicated UTI POA in the setting of indwelling Villaseñor POA  Diarrhea chronic POA  Acute Encephalopathy POA resolved likely on baseline personality disorder   Seizures  Anemia of chronic disease   DM type II (HgA1c 6.6)  Pressure ulcers / full thickness wounds POA  H/o Medical non compliance   S/P Right BKA        CONSULTATIONS:  IP CONSULT TO NEUROLOGY    Excerpted HPI from H&P of Suzanne Hurst MD:  CHIEF COMPLAINT: ms changes/confusion     HISTORY OF PRESENT ILLNESS:   Felicita Beckett is a 60 y.o. female with past medical history of type 2 diabetes mellitus (DM), diabetic gastroparesis, GERD, hypertension, noncompliance, orthostatic hypotension, stroke, quadriplegia rt bka  presented to the ED via EMS from from  home Patient is a poor historian, confused . As such, majority of history was obtained per review of ED/ medical records. Per these collective reports, per- niece\" calling with concern about patient. It is unclear what the primary concern from the family member was but per EMS the patient was found on the ground covered in stool. Patient was also noted to have a seizure like event in the truck for about 30 seconds. Patient denies knowing why she is here in the ED. She reports being on the ground for about an hour because she wanted to be there. She denies and chest pain, abdominal pain, shortness of breath, fever, chills, nausea, vomiting, diarrhea. Pt has A villaseñor cath in place      ______________________________________________________________________  DISCHARGE SUMMARY/HOSPITAL COURSE:  for full details see H&P, daily progress notes, labs, consult notes.        Sepsis due to Complicated UTI POA in the setting of indwelling Villaseñor POA  UC: Klebsiella (CRE) and pseudomonas. AB:CTX started on admission changed to Meropenem 1/11 for ESBL and later switched to oral cipro. Completed course of cipro 7/7 on 1/17  C/w Probiotic  Snowden changed 1/13      Diarrhea chronic POA  Multiple loose stools   C diff/cultures - negative  imodium prn now effective or helpful     Acute Encephalopathy POA resolved on suspect baseline personality disorder   DDx: post ictal vs TIA vs metabolic encephalopathy due to UTI     Seizures  Sx: patient have had a 30sec seizure like event in truck en route to hospital. While in hospital, nursing has observed intermittent episodes of slurred speech/stattering or difficulty speaking - this is likely her baseline. Overall MS stable while in the hospital. C/w Ativan prn which helpful.   -Neurology input appreciated:continue increased dose of keppra  -CT head nothing acute. Ammonia 35. TSH 1.25  -EEG showed encephalopathy and no ongoing seizure      Anemia of chronic disease   -per chart 09/2016 ( DC summary from PCP), pt had guaiac + stool but family declined any further w/u   -iron indices consistent with chronic disease (low iron, low TIBC, high ferritin)  -s/p 1 PRBC, Hg has remained stable around 8     DM type II (HgA1c 6.6)  -stopped metformin and SSI as BG running low normal range with occasional hypoglycemic episodes despite low doses of SSI. Will continue to monitor FBS twice weekly for now to avoid too much sticks. If her BG trends up in the future, then could restart some SSI coverage again.      Pressure ulcers / full thickness wounds POA  Involves left heel, dorsum left foot, sacral area and right hip. Continue wound care as ordered     H/o Medical non compliance   S/P Right BKA    _______________________________________________________________________  Patient seen and examined by me on discharge day. Pertinent Findings:  General: WD, WN. Alert, cooperative, chronically ill appearing  EENT:  EOMI. Anicteric sclerae.  MMM  Resp: CTA bilaterally, no wheezing or rales. No accessory muscle use  CV:  Regular rhythm,  No edema  GI:  Soft, Non distended, Non tender.  +Bowel sounds  Neurologic:  AAO x 2-3, normal speech, non focal  Extr: R BKA  Psych:   Poor insight. Not anxious nor agitated  Skin:  (+) left heel and sacral pressure ulcers with wounds over top of left foot and right hip area. See wound care note     _______________________________________________________________________  DISCHARGE MEDICATIONS:   Current Discharge Medication List      START taking these medications    Details   oxyCODONE IR (ROXICODONE) 5 mg immediate release tablet Take 1 Tab by mouth every four (4) hours as needed. Max Daily Amount: 30 mg.  Qty: 10 Tab, Refills: 0      loperamide (IMODIUM) 2 mg capsule Take 2 Caps by mouth every four (4) hours as needed for Diarrhea for up to 10 days. Qty: 30 Cap, Refills: 0      L. acidoph & paracasei- S therm- Bifido (LINSEY-Q/RISAQUAD) 8 billion cell cap cap Take 1 Cap by mouth daily. Qty: 30 Cap, Refills: 0         CONTINUE these medications which have CHANGED    Details   levETIRAcetam 1,000 mg tablet Take 1 Tab by mouth two (2) times a day. Qty: 60 Tab, Refills: 0      LORazepam (ATIVAN) 0.5 mg tablet Take 1 Tab by mouth every eight (8) hours as needed for Anxiety. Max Daily Amount: 1.5 mg.  Qty: 10 Tab, Refills: 0      sertraline (ZOLOFT) 50 mg tablet Take 1 Tab by mouth daily. Holding while on Cipro, restart 11/18  Qty: 30 Tab, Refills: 0         CONTINUE these medications which have NOT CHANGED    Details   omeprazole (PRILOSEC) 20 mg capsule Take 20 mg by mouth daily. ondansetron (ZOFRAN ODT) 8 mg disintegrating tablet Take 1 Tab by mouth every eight (8) hours as needed for Nausea. Qty: 15 Tab, Refills: 0      glucose 4 gram chewable tablet Take 4 Tabs by mouth as needed. Qty: 30 Tab, Refills: 2      ferrous sulfate 325 mg (65 mg iron) tablet Take 325 mg by mouth three (3) times daily (with meals). collagenase (SANTYL) 250 unit/gram ointment Apply  to affected area three (3) times daily. Apply to sacral wound every shift         STOP taking these medications       aluminum & magnesium hydroxide-simethicone (MAALOX MAXIMUM STRENGTH) 400-400-40 mg/5 mL suspension Comments:   Reason for Stopping:         vancomycin (VANCOCIN) 125 mg capsule Comments:   Reason for Stopping:         metFORMIN (GLUCOPHAGE) 500 mg tablet Comments:   Reason for Stopping:         insulin lispro (HUMALOG) 100 unit/mL injection Comments:   Reason for Stopping:         magnesium oxide (MAG-OX) 400 mg tablet Comments:   Reason for Stopping:         oxyCODONE-acetaminophen (PERCOCET) 5-325 mg per tablet Comments:   Reason for Stopping:               My Recommended Diet, Activity, Wound Care, and follow-up labs are listed in the patient's Discharge Insturctions which I have personally completed and reviewed.     _______________________________________________________________________  DISPOSITION:    Home with Family:    Home with HH/PT/OT/RN:    SNF/LTC: x   WINSTON:    OTHER:        Condition at Discharge:  Stable  _______________________________________________________________________  Follow up with:   PCP : Darnell Deluca MD  Follow-up Information     Follow up With Details [address] MD In 1 week  [place] [address]  [facility] [phone]                Total time in minutes spent coordinating this discharge (includes going over instructions, follow-up, prescriptions, and preparing report for sign off to her PCP) :  35 minutes    Signed:  Shima Chau MD

## 2017-01-20 NOTE — DIABETES MGMT
DTC Progress Note    Recommendations/ Comments: If appropriate, please consider:   1)  changing pt's correctional insulin scale to 1 unit: 50 mg/dl to reduce risk for hypoglycemia post correctional treatment for BG of 209 mg/dl. 2) if medically appropriate and pt is taking in po consider restarting her home regimen of Metformin as this may address overnight high BG and reduce the need for correctional dosing. Chart reviewed on Reginachocoadolfo Real. Pt with hypoglycemia reaction 1102 hr after correctional insulin dose per current scale of 60 mg/dl. Pt's diabetes is complicated by her gastroparesis and her neuropathy. Patient is a 60 y.o. female with known DM on Metformin 500 mg bid ac and Lispro correctional insulin 2 units:50 mg/dl >200 mg/dl at home. A1c:   Lab Results   Component Value Date/Time    Hemoglobin A1c 6.6 01/08/2017 10:12 PM    Hemoglobin A1c 5.8 09/20/2016 03:45 AM       Recent Glucose Results: Lab Results   Component Value Date/Time    GLUCPOC 111 (H) 01/20/2017 07:47 AM    GLUCPOC 192 (H) 01/19/2017 10:21 PM    GLUCPOC 118 (H) 01/19/2017 04:04 PM        Lab Results   Component Value Date/Time    Creatinine 0.72 01/15/2017 03:42 AM       Active Orders   Diet    DIET DIABETIC CONSISTENT CARB Regular; Disposable Dishes        PO intake: Patient Vitals for the past 72 hrs:   % Diet Eaten   01/20/17 0808 75 %   01/19/17 1717 75 %   01/18/17 1215 75 %   01/18/17 1211 75 %   01/18/17 1030 100 %   01/18/17 0830 75 %       Current hospital DM medication: Lispro correctional insulin - 2 units:50 mg/dl >200 mg/dl. Will continue to follow as needed.     Thank you  Leroy Shore RD, CDE

## 2017-01-20 NOTE — ROUTINE PROCESS
TRANSFER - OUT REPORT:    Verbal report given to Leslie(name) on Campbell Taveras  being transferred to [address] [place] [address] and rehab(unit) for routine progression of care       Report consisted of patients Situation, Background, Assessment and   Recommendations(SBAR). Information from the following report(s) SBAR, Kardex, Intake/Output, MAR and Recent Results was reviewed with the receiving nurse. Lines:       Opportunity for questions and clarification was provided.       Patient transported with:  ambulance

## 2017-01-23 NOTE — ACP (ADVANCE CARE PLANNING)
Attempted to reach out for hospital follow up. Patient was admitted to [facility]. For long term care. Spoke to [facility] with [facility] and called Solange Martin patient will be followed up by Dr Coty Isaac in person.

## 2021-01-03 NOTE — PROGRESS NOTES
Nutrition Assessment:    INTERVENTIONS/RECOMMENDATIONS:   Continue current diet order    ASSESSMENT:   Chart reviewed, medically noted for UTI, AMS, and anemia. PMH for DM, HTN, gastroparesis, CVA, and right BKA. Pt was resting during visit. RN reports she ate pretty well this morning. Per flowsheet, pt consuming 50-75% of meals. Will continue to follow intake    Diet Order: Consistent carb (Glucerna (L&D) Gelatien (B))  % Eaten:  Patient Vitals for the past 72 hrs:   % Diet Eaten   01/12/17 1200 50 %   01/12/17 0800 75 %   01/11/17 0906 75 %   01/10/17 1912 25 %   01/10/17 1251 75 %     Pertinent Medications: [] Reviewed []Other:  Pertinent Labs: [x]Reviewed  []Other: (K+ 3.2, ALK Phos 399, HA1C 6.6)   Food Allergies: [x]None []Other:     Last BM: 1/13   []Active     []Hyperactive  []Hypoactive       [] Absent  BS  Skin:    [] Intact   [] Incision  [x] Breakdown   []Edema   []Other:    Anthropometrics: Height: 5' 5\" (165.1 cm) Weight:       Last Weight Metrics:  Weight Loss Metrics 12/16/2016 10/4/2016 7/26/2016 7/8/2016 5/3/2016 4/23/2016 11/17/2015   Today's Wt 130 lb 141 lb 169 lb 1.5 oz 148 lb 9.4 oz 143 lb 143 lb 141 lb 8 oz   BMI 21.63 kg/m2 23.46 kg/m2 26.48 kg/m2 23.99 kg/m2 23.09 kg/m2 22.39 kg/m2 22.85 kg/m2       Estimated Nutrition Needs (Based on): 1772 Kcals/day (BMR (1171) x 1.3AF +250kcal for wound healing) , 89 g (-94g (1.5-1.6 g/kg bw)) Protein  Carbohydrate:  At Least 130 g/day  Fluids: 1772 mL/day     Pt expected to meet estimated nutrient needs: [x]Yes []No    NUTRITION DIAGNOSES:   Problem:  Increased protein needs      Etiology: related to wound healing     Signs/Symptoms: as evidenced by unstageable left heel, stage 4 sacrum       NUTRITION INTERVENTIONS:  Meals/Snacks: General/healthful diet   Supplements: Commercial supplement              GOAL:   consume >50% of meals and ONS in 4-6 days    NUTRITION MONITORING AND EVALUATION   Behavioral-Environmental Outcomes: Behavior  Food/Nutrient Intake Outcomes:  Total energy intake, Protein intake  Physical Signs/Symptoms Outcomes: Weight/weight change, Electrolyte and renal profile, Glucose profile    Previous Goal Met:   [x] Met              [] Progressing Towards Goal              [] Not Progressing Towards Goal   Previous Recommendations:   [x] Implemented          [] Not Implemented          [] Not Applicable    LEARNING NEEDS (Diet, Food/Nutrient-Drug Interaction):    [x] None Identified   [] Identified and Education Provided/Documented   [] Identified and Pt declined/was not appropriate     Cultural, Lutheran, OR Ethnic Dietary Needs:    [x] None Identified   [] Identified and Addressed     [x] Interdisciplinary Care Plan Reviewed/Documented    [x] Discharge Planning:   [] Participated in Interdisciplinary Rounds    NUTRITION RISK:    [] High              [] Moderate           []  Low  []  Minimal/Uncompromised      Ivan Woodruff RDN  Pager [phone]  Weekend Pager [phone] There are no Wet Read(s) to document.

## 2021-01-22 NOTE — PROGRESS NOTES
Bedside report received from  Arabella De La Cruz RN             Assessment, Background, Procedure summary, Intake/Output, MAR, and recent results discussed. Care assumed. 10-Sep-2020